# Patient Record
Sex: FEMALE | Race: WHITE | Employment: PART TIME | URBAN - METROPOLITAN AREA
[De-identification: names, ages, dates, MRNs, and addresses within clinical notes are randomized per-mention and may not be internally consistent; named-entity substitution may affect disease eponyms.]

---

## 2021-01-12 ENCOUNTER — HOSPITAL ENCOUNTER (OUTPATIENT)
Dept: ULTRASOUND IMAGING | Facility: HOSPITAL | Age: 31
Discharge: HOME | End: 2021-01-12
Attending: NURSE PRACTITIONER
Payer: COMMERCIAL

## 2021-01-12 ENCOUNTER — OFFICE VISIT (OUTPATIENT)
Dept: OBGYN CLINIC | Facility: CLINIC | Age: 31
End: 2021-01-12
Payer: COMMERCIAL

## 2021-01-12 ENCOUNTER — DOCUMENTATION ONLY (OUTPATIENT)
Dept: OBGYN CLINIC | Facility: CLINIC | Age: 31
End: 2021-01-12

## 2021-01-12 VITALS
HEART RATE: 94 BPM | SYSTOLIC BLOOD PRESSURE: 96 MMHG | HEIGHT: 63 IN | BODY MASS INDEX: 18.78 KG/M2 | DIASTOLIC BLOOD PRESSURE: 62 MMHG | WEIGHT: 106 LBS

## 2021-01-12 DIAGNOSIS — O36.8390 UNABLE TO HEAR FETAL HEART TONES AS REASON FOR ULTRASOUND SCAN: ICD-10-CM

## 2021-01-12 DIAGNOSIS — N92.6 MISSED MENSES: Primary | ICD-10-CM

## 2021-01-12 LAB
CONTROL LINE PRESENT WITH A CLEAR BACKGROUND (YES/NO): YES YES/NO
KIT LOT #: 0 NUMERIC
PREGNANCY TEST, URINE: POSITIVE

## 2021-01-12 PROCEDURE — 3078F DIAST BP <80 MM HG: CPT | Performed by: NURSE PRACTITIONER

## 2021-01-12 PROCEDURE — 3008F BODY MASS INDEX DOCD: CPT | Performed by: NURSE PRACTITIONER

## 2021-01-12 PROCEDURE — 3074F SYST BP LT 130 MM HG: CPT | Performed by: NURSE PRACTITIONER

## 2021-01-12 PROCEDURE — 76801 OB US < 14 WKS SINGLE FETUS: CPT | Performed by: NURSE PRACTITIONER

## 2021-01-12 PROCEDURE — 99203 OFFICE O/P NEW LOW 30 MIN: CPT | Performed by: NURSE PRACTITIONER

## 2021-01-12 PROCEDURE — 81025 URINE PREGNANCY TEST: CPT | Performed by: NURSE PRACTITIONER

## 2021-01-12 NOTE — PROGRESS NOTES
[facility], [facility]  Obstetrics and Gynecology  Missed Menses Visit  Queenie Holloway, MSN, APRN, FNP-BC    HPI   Kat Eddy is a 27year old  with Patient's last menstrual period was 10/15/2020. who presents for pregnancy confirmation.      Pa Number of children: Not on file      Years of education: Not on file      Highest education level: Not on file    Occupational History      Not on file    Social Needs      Financial resource strain: Not on file      Food insecurity        Worry: Not on fi 1ST TRIMESTER (CPT=76801)        Plan   - Unable to auscultate FHTs in office today - to go for stat hold and call  - Undecided on FTS at this time. Pt and SO counseled as she should be almost 13 weeks according to LMP.   For now will proceed with regular understanding for future plan of care.   - Greater than 50% of 30 minute visit spent in face to face counseling      Jo-Ann Ambriz, APRN  2:43 PM  1/12/2021

## 2021-01-13 NOTE — PROGRESS NOTES
Received phone call from [address] [facility] to review stat hold and call US results on 1/12/21 at 5:50 pm. Per US - single live IUP measuring 8w3d.   Upon speaking with patient, patient notes that she forgot to mention during our appointment that she did have bleeding cons

## 2021-01-20 ENCOUNTER — TELEPHONE (OUTPATIENT)
Dept: OBGYN CLINIC | Facility: CLINIC | Age: 31
End: 2021-01-20

## 2021-01-20 DIAGNOSIS — Z32.01 PREGNANCY TEST POSITIVE: Primary | ICD-10-CM

## 2021-01-21 ENCOUNTER — NURSE ONLY (OUTPATIENT)
Dept: OBGYN CLINIC | Facility: CLINIC | Age: 31
End: 2021-01-21

## 2021-01-21 ENCOUNTER — ROUTINE PRENATAL (OUTPATIENT)
Dept: OBGYN CLINIC | Facility: CLINIC | Age: 31
End: 2021-01-21
Payer: COMMERCIAL

## 2021-01-21 VITALS — BODY MASS INDEX: 19 KG/M2 | WEIGHT: 109 LBS | DIASTOLIC BLOOD PRESSURE: 72 MMHG | SYSTOLIC BLOOD PRESSURE: 104 MMHG

## 2021-01-21 DIAGNOSIS — N93.9 VAGINAL BLEEDING: ICD-10-CM

## 2021-01-21 DIAGNOSIS — R30.0 DYSURIA: ICD-10-CM

## 2021-01-21 DIAGNOSIS — R10.9 ABDOMINAL CRAMPING: Primary | ICD-10-CM

## 2021-01-21 DIAGNOSIS — O41.8X10 SUBCHORIONIC HEMATOMA IN FIRST TRIMESTER, SINGLE OR UNSPECIFIED FETUS: ICD-10-CM

## 2021-01-21 DIAGNOSIS — Z34.01 ENCOUNTER FOR SUPERVISION OF NORMAL FIRST PREGNANCY IN FIRST TRIMESTER: Primary | ICD-10-CM

## 2021-01-21 DIAGNOSIS — O46.8X1 SUBCHORIONIC HEMATOMA IN FIRST TRIMESTER, SINGLE OR UNSPECIFIED FETUS: ICD-10-CM

## 2021-01-21 LAB
BILIRUB UR QL: NEGATIVE
COLOR UR: YELLOW
GLUCOSE UR-MCNC: NEGATIVE MG/DL
KETONES UR-MCNC: NEGATIVE MG/DL
LEUKOCYTE ESTERASE UR QL STRIP.AUTO: NEGATIVE
NITRITE UR QL STRIP.AUTO: NEGATIVE
PH UR: 5 [PH] (ref 5–8)
PROT UR-MCNC: NEGATIVE MG/DL
RBC #/AREA URNS AUTO: 1 /HPF
SP GR UR STRIP: 1.02 (ref 1–1.03)
UROBILINOGEN UR STRIP-ACNC: <2
WBC #/AREA URNS AUTO: 2 /HPF

## 2021-01-21 PROCEDURE — 3078F DIAST BP <80 MM HG: CPT | Performed by: OBSTETRICS & GYNECOLOGY

## 2021-01-21 PROCEDURE — 99204 OFFICE O/P NEW MOD 45 MIN: CPT | Performed by: OBSTETRICS & GYNECOLOGY

## 2021-01-21 PROCEDURE — 3074F SYST BP LT 130 MM HG: CPT | Performed by: OBSTETRICS & GYNECOLOGY

## 2021-01-21 PROCEDURE — 76817 TRANSVAGINAL US OBSTETRIC: CPT | Performed by: OBSTETRICS & GYNECOLOGY

## 2021-01-21 NOTE — TELEPHONE ENCOUNTER
Pt noted she was eating dinner and noted on urination had pink/red,  Had a abd cramp that lasted about 60 sec.   Pt counseled on going to the ER if has any sx,   Currently [t has no sx and pt plans on calling office in the morning to schedule appt in office

## 2021-01-21 NOTE — PROGRESS NOTES
HPI:   Aaron Hidalgo is a 27year old female who presents for a new pt  problem visit. Pt c/o abd cramping and vaginal bleeding last night. Pt was counseled extensively on possible etiologies of cramping and vaginal bleeding.  Order for abo rh se Corpus luteum cyst right ovary.         Dictated by (CST): Vicenta Sultana MD on 1/12/2021 at [facility] 24 by (CST): Vicenta Sultana MD on 1/12/2021 at 5:49 PM             Wt Readings from Last 6 Encounters:  01/21/21 : 109 lb (49.4 kg) is normal,scant discharge,cervix is pink,no adnexal masses or tenderness,     tv ultrasound shows:  Normal uterus, normal adnexa, normal cervix, long and closed  siup crl 2/79 cm 9 4/7 weeks and edc 8/22/21  2.5 X 0.39 cm subchor hemm noted, advised pelvic

## 2021-01-21 NOTE — PROGRESS NOTES
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  Education materials reviewed with pt including, but not limited to: plan of care,

## 2021-01-28 PROBLEM — O41.8X10 SUBCHORIONIC HEMATOMA IN FIRST TRIMESTER: Status: ACTIVE | Noted: 2021-01-28

## 2021-01-28 PROBLEM — R10.9 ABDOMINAL CRAMPING: Status: ACTIVE | Noted: 2021-01-28

## 2021-01-28 PROBLEM — O46.8X1 SUBCHORIONIC HEMATOMA IN FIRST TRIMESTER: Status: ACTIVE | Noted: 2021-01-28

## 2021-01-30 ENCOUNTER — TELEPHONE (OUTPATIENT)
Dept: OBGYN CLINIC | Facility: CLINIC | Age: 31
End: 2021-01-30

## 2021-01-30 NOTE — TELEPHONE ENCOUNTER
Per pt 11 week and recently stated having nausea with vomiting. Is able to keep fluids down, but having difficulty keeping solids down. Had soup over an hour ago and was able to keep that down. Feeling better.  Reviewed home remedies and when to call office

## 2021-01-30 NOTE — TELEPHONE ENCOUNTER
Patient calling stating she has been vomiting for the last 24 hours and wants to know if that is ok? And if she can get something for it.     thanks

## 2021-02-18 ENCOUNTER — INITIAL PRENATAL (OUTPATIENT)
Dept: OBGYN CLINIC | Facility: CLINIC | Age: 31
End: 2021-02-18
Payer: COMMERCIAL

## 2021-02-18 VITALS — WEIGHT: 109 LBS | DIASTOLIC BLOOD PRESSURE: 62 MMHG | SYSTOLIC BLOOD PRESSURE: 98 MMHG | BODY MASS INDEX: 19 KG/M2

## 2021-02-18 DIAGNOSIS — Z34.91 NORMAL PREGNANCY IN FIRST TRIMESTER: Primary | ICD-10-CM

## 2021-02-18 LAB
APPEARANCE: CLEAR
MULTISTIX LOT#: 1044 NUMERIC
PH, URINE: 6 (ref 4.5–8)
SPECIFIC GRAVITY: 1.02 (ref 1–1.03)
URINE-COLOR: YELLOW
UROBILINOGEN,SEMI-QN: 0.2 MG/DL (ref 0–1.9)

## 2021-02-18 PROCEDURE — 3074F SYST BP LT 130 MM HG: CPT | Performed by: OBSTETRICS & GYNECOLOGY

## 2021-02-18 PROCEDURE — 81002 URINALYSIS NONAUTO W/O SCOPE: CPT | Performed by: OBSTETRICS & GYNECOLOGY

## 2021-02-18 PROCEDURE — 3078F DIAST BP <80 MM HG: CPT | Performed by: OBSTETRICS & GYNECOLOGY

## 2021-02-18 RX ORDER — DOXYLAMINE SUCCINATE AND PYRIDOXINE HYDROCHLORIDE, DELAYED RELEASE TABLETS 10 MG/10 MG 10; 10 MG/1; MG/1
2 TABLET, DELAYED RELEASE ORAL NIGHTLY
Qty: 120 TABLET | Refills: 0 | Status: SHIPPED | OUTPATIENT
Start: 2021-02-18 | End: 2021-05-14

## 2021-02-18 NOTE — PROGRESS NOTES
Pregnancy counseling. Pt did not want genetic screening. . Pt counseled on OB2 ultrasound structural survey at 20 weeks. Order entered.

## 2021-02-19 LAB
C TRACH DNA SPEC QL NAA+PROBE: NEGATIVE
HPV I/H RISK 1 DNA SPEC QL NAA+PROBE: NEGATIVE
N GONORRHOEA DNA SPEC QL NAA+PROBE: NEGATIVE

## 2021-02-22 LAB
LAST PAP RESULT: NORMAL
PAP HISTORY (OTHER THAN LAST PAP): NORMAL

## 2021-02-25 ENCOUNTER — TELEPHONE (OUTPATIENT)
Dept: OBGYN CLINIC | Facility: CLINIC | Age: 31
End: 2021-02-25

## 2021-02-25 NOTE — TELEPHONE ENCOUNTER
Approved today  QLANCD:[id number];LITO:[id number]; Review Type:Prior Auth; Coverage Start Date:02/25/2021; Coverage End Date:11/22/2021;  Drug  Doxylamine-Pyridoxine 10-10MG dr tablets  Form  [employer] Electronic PA Form (4305 NCPDP)  Original Claim Inf

## 2021-03-15 ENCOUNTER — ROUTINE PRENATAL (OUTPATIENT)
Dept: OBGYN CLINIC | Facility: CLINIC | Age: 31
End: 2021-03-15
Payer: COMMERCIAL

## 2021-03-15 VITALS — SYSTOLIC BLOOD PRESSURE: 95 MMHG | WEIGHT: 112 LBS | BODY MASS INDEX: 20 KG/M2 | DIASTOLIC BLOOD PRESSURE: 59 MMHG

## 2021-03-15 DIAGNOSIS — Z34.82 ENCOUNTER FOR SUPERVISION OF OTHER NORMAL PREGNANCY IN SECOND TRIMESTER: Primary | ICD-10-CM

## 2021-03-15 LAB
APPEARANCE: CLEAR
MULTISTIX LOT#: 5077 NUMERIC
PH, URINE: 6.5 (ref 4.5–8)
SPECIFIC GRAVITY: 1.01 (ref 1–1.03)
URINE-COLOR: YELLOW
UROBILINOGEN,SEMI-QN: 0.2 MG/DL (ref 0–1.9)

## 2021-03-15 PROCEDURE — 3078F DIAST BP <80 MM HG: CPT | Performed by: OBSTETRICS & GYNECOLOGY

## 2021-03-15 PROCEDURE — 3074F SYST BP LT 130 MM HG: CPT | Performed by: OBSTETRICS & GYNECOLOGY

## 2021-03-15 PROCEDURE — 81002 URINALYSIS NONAUTO W/O SCOPE: CPT | Performed by: OBSTETRICS & GYNECOLOGY

## 2021-03-15 NOTE — PROGRESS NOTES
[facility], [facility]  Obstetrics and Gynecology  Prenatal Visit  Giselle Ibarra MD    HPI   [address] is a 27year old. o.  17w1d weeks. Patient denies significant movement at this time.   She denies vaginal bleeding or significant crampin

## 2021-03-16 ENCOUNTER — LAB ENCOUNTER (OUTPATIENT)
Dept: LAB | Age: 31
End: 2021-03-16
Attending: OBSTETRICS & GYNECOLOGY
Payer: COMMERCIAL

## 2021-03-16 DIAGNOSIS — Z34.01 ENCOUNTER FOR SUPERVISION OF NORMAL FIRST PREGNANCY IN FIRST TRIMESTER: ICD-10-CM

## 2021-03-16 LAB
ANTIBODY SCREEN: NEGATIVE
BASOPHILS # BLD AUTO: 0.05 X10(3) UL (ref 0–0.2)
BASOPHILS NFR BLD AUTO: 0.6 %
DEPRECATED RDW RBC AUTO: 45.9 FL (ref 35.1–46.3)
EOSINOPHIL # BLD AUTO: 0.04 X10(3) UL (ref 0–0.7)
EOSINOPHIL NFR BLD AUTO: 0.4 %
ERYTHROCYTE [DISTWIDTH] IN BLOOD BY AUTOMATED COUNT: 13.3 % (ref 11–15)
HBV SURFACE AG SER-ACNC: 0.18 [IU]/L
HBV SURFACE AG SERPL QL IA: NONREACTIVE
HCT VFR BLD AUTO: 33.5 %
HCV AB SERPL QL IA: NONREACTIVE
HGB BLD-MCNC: 11.4 G/DL
IMM GRANULOCYTES # BLD AUTO: 0.05 X10(3) UL (ref 0–1)
IMM GRANULOCYTES NFR BLD: 0.6 %
LYMPHOCYTES # BLD AUTO: 1.09 X10(3) UL (ref 1–4)
LYMPHOCYTES NFR BLD AUTO: 12 %
MCH RBC QN AUTO: 31.9 PG (ref 26–34)
MCHC RBC AUTO-ENTMCNC: 34 G/DL (ref 31–37)
MCV RBC AUTO: 93.8 FL
MONOCYTES # BLD AUTO: 0.71 X10(3) UL (ref 0.1–1)
MONOCYTES NFR BLD AUTO: 7.8 %
NEUTROPHILS # BLD AUTO: 7.12 X10 (3) UL (ref 1.5–7.7)
NEUTROPHILS # BLD AUTO: 7.12 X10(3) UL (ref 1.5–7.7)
NEUTROPHILS NFR BLD AUTO: 78.6 %
PLATELET # BLD AUTO: 197 10(3)UL (ref 150–450)
RBC # BLD AUTO: 3.57 X10(6)UL
RH BLOOD TYPE: NEGATIVE
RUBV IGG SER QL: POSITIVE
RUBV IGG SER-ACNC: 329.7 IU/ML (ref 10–?)
WBC # BLD AUTO: 9.1 X10(3) UL (ref 4–11)

## 2021-03-16 PROCEDURE — 85025 COMPLETE CBC W/AUTO DIFF WBC: CPT

## 2021-03-16 PROCEDURE — 87340 HEPATITIS B SURFACE AG IA: CPT

## 2021-03-16 PROCEDURE — 86901 BLOOD TYPING SEROLOGIC RH(D): CPT

## 2021-03-16 PROCEDURE — 86780 TREPONEMA PALLIDUM: CPT

## 2021-03-16 PROCEDURE — 86900 BLOOD TYPING SEROLOGIC ABO: CPT

## 2021-03-16 PROCEDURE — 87389 HIV-1 AG W/HIV-1&-2 AB AG IA: CPT

## 2021-03-16 PROCEDURE — 86762 RUBELLA ANTIBODY: CPT

## 2021-03-16 PROCEDURE — 86850 RBC ANTIBODY SCREEN: CPT

## 2021-03-16 PROCEDURE — 86803 HEPATITIS C AB TEST: CPT

## 2021-03-16 PROCEDURE — 36415 COLL VENOUS BLD VENIPUNCTURE: CPT

## 2021-03-16 PROCEDURE — 87086 URINE CULTURE/COLONY COUNT: CPT

## 2021-03-17 LAB — T PALLIDUM AB SER QL: NEGATIVE

## 2021-03-20 PROBLEM — Z67.91 RH NEGATIVE STATE IN ANTEPARTUM PERIOD: Status: ACTIVE | Noted: 2021-03-20

## 2021-03-20 PROBLEM — O26.899 RH NEGATIVE STATE IN ANTEPARTUM PERIOD: Status: ACTIVE | Noted: 2021-03-20

## 2021-03-23 ENCOUNTER — TELEPHONE (OUTPATIENT)
Dept: OBGYN CLINIC | Facility: CLINIC | Age: 31
End: 2021-03-23

## 2021-03-23 NOTE — TELEPHONE ENCOUNTER
----- Message from Bibi Marin MD sent at 3/20/2021  8:18 PM CDT -----  O negative,  will need rhophylac at 28 weeks. Mild anemia of 11.4,  pt to take daily Fe.

## 2021-03-30 ENCOUNTER — LAB ENCOUNTER (OUTPATIENT)
Dept: LAB | Age: 31
End: 2021-03-30
Attending: OBSTETRICS & GYNECOLOGY
Payer: COMMERCIAL

## 2021-03-30 ENCOUNTER — TELEPHONE (OUTPATIENT)
Dept: OBGYN CLINIC | Facility: CLINIC | Age: 31
End: 2021-03-30

## 2021-03-30 ENCOUNTER — PATIENT MESSAGE (OUTPATIENT)
Dept: OBGYN CLINIC | Facility: CLINIC | Age: 31
End: 2021-03-30

## 2021-03-30 DIAGNOSIS — R30.0 DYSURIA: ICD-10-CM

## 2021-03-30 DIAGNOSIS — R30.0 DYSURIA: Primary | ICD-10-CM

## 2021-03-30 RX ORDER — NITROFURANTOIN 25; 75 MG/1; MG/1
100 CAPSULE ORAL 2 TIMES DAILY
Qty: 14 CAPSULE | Refills: 0 | Status: SHIPPED | OUTPATIENT
Start: 2021-03-30 | End: 2021-04-06

## 2021-03-30 NOTE — TELEPHONE ENCOUNTER
Patient name and  verified. Urine culture and UA ordered for patient per UTI protocol. Patient given central scheduling number to complete lab.

## 2021-03-30 NOTE — TELEPHONE ENCOUNTER
Patient has a UTI. That would explain her symptoms. Send in Macrobid 100 mg po BID x 7 days. I am ordering Macrobid because patient is allergic to Penicillin.   I also sent the order in my message under the Lab Tab so please do not send in the medication

## 2021-04-05 ENCOUNTER — HOSPITAL ENCOUNTER (OUTPATIENT)
Dept: PERINATAL CARE | Facility: HOSPITAL | Age: 31
Discharge: HOME | End: 2021-04-05
Attending: OBSTETRICS & GYNECOLOGY
Payer: COMMERCIAL

## 2021-04-05 VITALS
HEART RATE: 94 BPM | BODY MASS INDEX: 21 KG/M2 | DIASTOLIC BLOOD PRESSURE: 55 MMHG | SYSTOLIC BLOOD PRESSURE: 96 MMHG | WEIGHT: 117 LBS

## 2021-04-05 DIAGNOSIS — Z36.89 ENCOUNTER FOR FETAL ANATOMIC SURVEY: ICD-10-CM

## 2021-04-05 DIAGNOSIS — Z36.89 ENCOUNTER FOR FETAL ANATOMIC SURVEY: Primary | ICD-10-CM

## 2021-04-05 PROCEDURE — 76805 OB US >/= 14 WKS SNGL FETUS: CPT | Performed by: OBSTETRICS & GYNECOLOGY

## 2021-04-05 NOTE — PROGRESS NOTES
BP 96/55   Pulse 94   Wt 117 lb (53.1 kg)   LMP 11/15/2020 (Exact Date)   BMI 20.73 kg/m²       OB ULTRASOUND REPORT   See imaging tab for complete ultrasound report or in PACS    Single IUP in cephalic presentation. Placenta is posterior.    A 3 vessel

## 2021-04-13 ENCOUNTER — ROUTINE PRENATAL (OUTPATIENT)
Dept: OBGYN CLINIC | Facility: CLINIC | Age: 31
End: 2021-04-13
Payer: COMMERCIAL

## 2021-04-13 VITALS — DIASTOLIC BLOOD PRESSURE: 60 MMHG | WEIGHT: 118 LBS | BODY MASS INDEX: 21 KG/M2 | SYSTOLIC BLOOD PRESSURE: 100 MMHG

## 2021-04-13 DIAGNOSIS — Z34.82 ENCOUNTER FOR SUPERVISION OF OTHER NORMAL PREGNANCY IN SECOND TRIMESTER: Primary | ICD-10-CM

## 2021-04-13 PROCEDURE — 3078F DIAST BP <80 MM HG: CPT | Performed by: OBSTETRICS & GYNECOLOGY

## 2021-04-13 PROCEDURE — 81002 URINALYSIS NONAUTO W/O SCOPE: CPT | Performed by: OBSTETRICS & GYNECOLOGY

## 2021-04-13 PROCEDURE — 3074F SYST BP LT 130 MM HG: CPT | Performed by: OBSTETRICS & GYNECOLOGY

## 2021-04-13 NOTE — PROGRESS NOTES
No C/Os. Discussed Tdap vaccine and Rho Barry injection. All questions answered. OB ultrasound reviewed.

## 2021-04-17 ENCOUNTER — PATIENT MESSAGE (OUTPATIENT)
Dept: OBGYN CLINIC | Facility: CLINIC | Age: 31
End: 2021-04-17

## 2021-04-17 ENCOUNTER — LAB ENCOUNTER (OUTPATIENT)
Dept: LAB | Age: 31
End: 2021-04-17
Attending: OBSTETRICS & GYNECOLOGY
Payer: COMMERCIAL

## 2021-04-17 DIAGNOSIS — R30.0 DYSURIA: ICD-10-CM

## 2021-04-17 DIAGNOSIS — R30.0 DYSURIA: Primary | ICD-10-CM

## 2021-04-17 PROCEDURE — 87088 URINE BACTERIA CULTURE: CPT

## 2021-04-17 PROCEDURE — 81001 URINALYSIS AUTO W/SCOPE: CPT

## 2021-04-17 PROCEDURE — 87086 URINE CULTURE/COLONY COUNT: CPT

## 2021-04-17 PROCEDURE — 87186 SC STD MICRODIL/AGAR DIL: CPT

## 2021-04-17 RX ORDER — NITROFURANTOIN 25; 75 MG/1; MG/1
100 CAPSULE ORAL 2 TIMES DAILY
Qty: 14 CAPSULE | Refills: 0 | Status: SHIPPED | OUTPATIENT
Start: 2021-04-17 | End: 2021-04-24

## 2021-04-17 NOTE — TELEPHONE ENCOUNTER
OB History     T0    L0    SAB0  TAB0  Ectopic0  Multiple0  Live Births0   21w6d    Patient name and  verified. Patient complaining of back pain, urinary frequency and urgency. informed [employer] message was routed to [address] [place] [address].  UA and urine cx ente

## 2021-04-18 NOTE — TELEPHONE ENCOUNTER
Pt stated her pharm is closed , requested rx sent to new pharmacy,  Sent to [address],  Pt appraised and to go there now for her macrobid.

## 2021-04-18 NOTE — TELEPHONE ENCOUNTER
Pt called and left message. Pt left message today with uti sx, and urinalysis c/w prob uti. rx for macrobid sent and pt to start today, f/u office by Monday, to triage or er if sx not improving prior to Monday.

## 2021-04-18 NOTE — TELEPHONE ENCOUNTER
uti noted,  Pt called back and counseled pt and pt to start macrobid now and hydrate/cranberry juice. Pt counseled and to call if sx not resolved or worse.

## 2021-04-29 ENCOUNTER — LAB ENCOUNTER (OUTPATIENT)
Dept: LAB | Age: 31
End: 2021-04-29
Attending: OBSTETRICS & GYNECOLOGY
Payer: COMMERCIAL

## 2021-04-29 DIAGNOSIS — Z32.01 PREGNANCY TEST POSITIVE: ICD-10-CM

## 2021-04-29 PROCEDURE — 87086 URINE CULTURE/COLONY COUNT: CPT

## 2021-04-29 PROCEDURE — 87186 SC STD MICRODIL/AGAR DIL: CPT

## 2021-04-29 PROCEDURE — 87088 URINE BACTERIA CULTURE: CPT

## 2021-05-01 ENCOUNTER — TELEPHONE (OUTPATIENT)
Dept: OBGYN CLINIC | Facility: CLINIC | Age: 31
End: 2021-05-01

## 2021-05-01 RX ORDER — NITROFURANTOIN 25; 75 MG/1; MG/1
100 CAPSULE ORAL 2 TIMES DAILY
Qty: 14 CAPSULE | Refills: 0 | Status: SHIPPED | OUTPATIENT
Start: 2021-05-01 | End: 2021-05-08

## 2021-05-01 NOTE — TELEPHONE ENCOUNTER
E coli uti noted again,  rx for macrobid sent to pharmacy,  Please inform pt of urine culture results from today and macrobid rx sent to pharmacy.   Pt needs to take one tablet every 12 hours for 7 days, and needs to eat/drink right before each tablet, as w

## 2021-05-07 ENCOUNTER — TELEPHONE (OUTPATIENT)
Dept: OBGYN CLINIC | Facility: CLINIC | Age: 31
End: 2021-05-07

## 2021-05-07 NOTE — TELEPHONE ENCOUNTER
Per pt she has one more pill left today and two more doses for tomorrow. Per ASJ, pt to take to completion repeat urine culture as soon as possible after she completes tx.      Per pt she is concerned about the recurrence since this is the third infection i

## 2021-05-07 NOTE — TELEPHONE ENCOUNTER
Patient calling wanting to know if she can get a refill on medication for a UTI. States still having symptoms.  Been taking medication for over a week

## 2021-05-14 ENCOUNTER — LAB ENCOUNTER (OUTPATIENT)
Dept: LAB | Age: 31
End: 2021-05-14
Attending: OBSTETRICS & GYNECOLOGY
Payer: COMMERCIAL

## 2021-05-14 ENCOUNTER — TELEPHONE (OUTPATIENT)
Dept: OBGYN CLINIC | Facility: CLINIC | Age: 31
End: 2021-05-14

## 2021-05-14 ENCOUNTER — TELEPHONE (OUTPATIENT)
Dept: PERINATAL CARE | Facility: HOSPITAL | Age: 31
End: 2021-05-14

## 2021-05-14 ENCOUNTER — ROUTINE PRENATAL (OUTPATIENT)
Dept: OBGYN CLINIC | Facility: CLINIC | Age: 31
End: 2021-05-14
Payer: COMMERCIAL

## 2021-05-14 VITALS — WEIGHT: 124 LBS | SYSTOLIC BLOOD PRESSURE: 95 MMHG | DIASTOLIC BLOOD PRESSURE: 56 MMHG | BODY MASS INDEX: 22 KG/M2

## 2021-05-14 DIAGNOSIS — O23.42 UTI IN PREGNANCY, ANTEPARTUM, SECOND TRIMESTER: ICD-10-CM

## 2021-05-14 DIAGNOSIS — O23.42 RECURRENT UTI (URINARY TRACT INFECTION) COMPLICATING PREGNANCY, SECOND TRIMESTER: ICD-10-CM

## 2021-05-14 DIAGNOSIS — R10.9 RIGHT FLANK PAIN: ICD-10-CM

## 2021-05-14 DIAGNOSIS — Z34.82 ENCOUNTER FOR SUPERVISION OF OTHER NORMAL PREGNANCY IN SECOND TRIMESTER: Primary | ICD-10-CM

## 2021-05-14 DIAGNOSIS — O36.62X1: ICD-10-CM

## 2021-05-14 PROBLEM — Z34.90 SUPERVISION OF NORMAL PREGNANCY: Status: ACTIVE | Noted: 2021-05-14

## 2021-05-14 PROCEDURE — 87086 URINE CULTURE/COLONY COUNT: CPT

## 2021-05-14 PROCEDURE — 3074F SYST BP LT 130 MM HG: CPT | Performed by: OBSTETRICS & GYNECOLOGY

## 2021-05-14 PROCEDURE — 81002 URINALYSIS NONAUTO W/O SCOPE: CPT | Performed by: OBSTETRICS & GYNECOLOGY

## 2021-05-14 PROCEDURE — 3078F DIAST BP <80 MM HG: CPT | Performed by: OBSTETRICS & GYNECOLOGY

## 2021-05-14 NOTE — TELEPHONE ENCOUNTER
Natalie Iniguez MD      I spoke with M Dr. Carlo Morillo who said he would \"try to figure something out\" to get her in for growth ultrasound.

## 2021-05-14 NOTE — TELEPHONE ENCOUNTER
Incoming call received from [facility]. Patient scheduled for growth at 28 weeks (6/1/2021) and was soonest appointment available. Patient upset about scheduling since due to [facility] limited availbility. Does patient need to be seen sooner than 6/1/2021?  Please advis

## 2021-05-14 NOTE — TELEPHONE ENCOUNTER
Spoke to Radha Guerra to request clarification of the urgency for Growth ultrasound. Patient stated that she was told it needed to be done as soon as possible. Patient is 25 5/7. I scheduled patient at 28 wks.   Patient requested other [facility] location phone num

## 2021-05-14 NOTE — PROGRESS NOTES
Reports good fetal movements. Complains of persisting right flank pain and cloudy urine. Denies fever or chills. Patient has had recurrent urinary tract infections. Culture confirmed E. coli UTIs in March and April.   Very mild right flank tenderness on

## 2021-05-16 ENCOUNTER — HOSPITAL ENCOUNTER (OUTPATIENT)
Dept: ULTRASOUND IMAGING | Age: 31
Discharge: HOME | End: 2021-05-16
Attending: OBSTETRICS & GYNECOLOGY
Payer: COMMERCIAL

## 2021-05-16 DIAGNOSIS — O23.42 RECURRENT UTI (URINARY TRACT INFECTION) COMPLICATING PREGNANCY, SECOND TRIMESTER: ICD-10-CM

## 2021-05-16 DIAGNOSIS — R10.9 RIGHT FLANK PAIN: ICD-10-CM

## 2021-05-16 PROCEDURE — 76775 US EXAM ABDO BACK WALL LIM: CPT | Performed by: OBSTETRICS & GYNECOLOGY

## 2021-05-25 NOTE — PROGRESS NOTES
Outpatient Maternal-Fetal Medicine Consultation    Dear Dr. Carri Levine    Thank you for requesting ultrasound evaluation and maternal fetal medicine consultation on your patient Leonardo Ortiz.   As you are aware she is a 32year old female [address] [facility] ultrasound cannot rule out all structural and chromosomal abnormalities. See PACS/Imaging Tab For Complete Ultrasound Report  I interpreted the results and reviewed them with the patient.     DISCUSSION  During her visit we discussed and reviewed the fo

## 2021-06-01 ENCOUNTER — HOSPITAL ENCOUNTER (OUTPATIENT)
Dept: PERINATAL CARE | Facility: HOSPITAL | Age: 31
Discharge: HOME | End: 2021-06-01
Attending: OBSTETRICS & GYNECOLOGY
Payer: COMMERCIAL

## 2021-06-01 ENCOUNTER — ROUTINE PRENATAL (OUTPATIENT)
Dept: OBGYN CLINIC | Facility: CLINIC | Age: 31
End: 2021-06-01
Payer: COMMERCIAL

## 2021-06-01 VITALS
DIASTOLIC BLOOD PRESSURE: 66 MMHG | BODY MASS INDEX: 22 KG/M2 | SYSTOLIC BLOOD PRESSURE: 98 MMHG | HEART RATE: 106 BPM | WEIGHT: 125 LBS

## 2021-06-01 VITALS — BODY MASS INDEX: 22 KG/M2 | WEIGHT: 125.63 LBS | SYSTOLIC BLOOD PRESSURE: 92 MMHG | DIASTOLIC BLOOD PRESSURE: 60 MMHG

## 2021-06-01 DIAGNOSIS — O36.63X0 EXCESSIVE FETAL GROWTH AFFECTING MANAGEMENT OF PREGNANCY IN THIRD TRIMESTER, SINGLE OR UNSPECIFIED FETUS: ICD-10-CM

## 2021-06-01 DIAGNOSIS — Z34.83 ENCOUNTER FOR SUPERVISION OF OTHER NORMAL PREGNANCY IN THIRD TRIMESTER: Primary | ICD-10-CM

## 2021-06-01 DIAGNOSIS — O36.63X0 EXCESSIVE FETAL GROWTH AFFECTING MANAGEMENT OF PREGNANCY IN THIRD TRIMESTER, SINGLE OR UNSPECIFIED FETUS: Primary | ICD-10-CM

## 2021-06-01 PROCEDURE — 76816 OB US FOLLOW-UP PER FETUS: CPT | Performed by: OBSTETRICS & GYNECOLOGY

## 2021-06-01 PROCEDURE — 81003 URINALYSIS AUTO W/O SCOPE: CPT | Performed by: NURSE PRACTITIONER

## 2021-06-01 PROCEDURE — 3074F SYST BP LT 130 MM HG: CPT | Performed by: NURSE PRACTITIONER

## 2021-06-01 PROCEDURE — 3078F DIAST BP <80 MM HG: CPT | Performed by: NURSE PRACTITIONER

## 2021-06-01 PROCEDURE — 99242 OFF/OP CONSLTJ NEW/EST SF 20: CPT | Performed by: OBSTETRICS & GYNECOLOGY

## 2021-06-01 RX ORDER — BREAST PUMP
EACH MISCELLANEOUS
Qty: 1 EACH | Refills: 0 | Status: SHIPPED | OUTPATIENT
Start: 2021-06-01

## 2021-06-01 RX ORDER — FERROUS SULFATE 137(45) MG
TABLET, EXTENDED RELEASE ORAL DAILY
COMMUNITY

## 2021-06-01 NOTE — PROGRESS NOTES
[facility], [facility]  Obstetrics and Gynecology  28 Week Prenatal Visit  Dmitriy Buenrostro, MSN, APRN, FNP-BC      HPI   Romana Kacey is a 32year old.  28w2d weeks. TRACIE 21. Doing well.  Saw MFM today who noted excessive fetal growth, baby Omar Marquez is a 32year old female  with viable IUP at 28w2d weeks.    (Z34.83) Encounter for supervision of other normal pregnancy in third trimester  (primary encounter diagnosis)  Plan: URINALYSIS, AUTO, W/O SCOPE    (O36.63X0) Excessive fetal gr

## 2021-06-02 ENCOUNTER — TELEPHONE (OUTPATIENT)
Dept: OBGYN CLINIC | Facility: CLINIC | Age: 31
End: 2021-06-02

## 2021-06-02 DIAGNOSIS — O36.60X0 EXCESSIVE FETAL GROWTH AFFECTING MANAGEMENT OF PREGNANCY, ANTEPARTUM, SINGLE OR UNSPECIFIED FETUS: Primary | ICD-10-CM

## 2021-06-02 NOTE — TELEPHONE ENCOUNTER
Patient seen on 6/1/2021 with ANB (see exam note). ANB reviewed fetal excessive growth with patient and reinforced importance on completing 28 week labs. Next PN appt is 6/16/2021.

## 2021-06-02 NOTE — TELEPHONE ENCOUNTER
Please inform patient that as her baby is measuring large, it is important that she do her Glucola diabetes screen as soon as possible. Next prenatal appointment also needs to be scheduled within 2 weeks from her previous.     RhoGam shot also needed for R

## 2021-06-02 NOTE — TELEPHONE ENCOUNTER
Pt informed. Order placed for growth through MFM. Pt advised to cancel apt for growth at [facility]. Number given to MFM. Verbalized understanding and agreement. No further question.

## 2021-06-02 NOTE — TELEPHONE ENCOUNTER
Pt asking regarding the f/u recommendation given by MFM yesterday by Dr. Julianna Burrell, asked if we would put in the order.  Pls advise

## 2021-06-02 NOTE — TELEPHONE ENCOUNTER
Pt informed of need to have blood type and antibody screen before her rhogam. Pt verbalized understanding and informed she had made an apt for blood work this Saturday morning.  Scheduled pt for the afternoon, instructed to call prior to make sure results i

## 2021-06-03 ENCOUNTER — TELEPHONE (OUTPATIENT)
Dept: OBGYN CLINIC | Facility: CLINIC | Age: 31
End: 2021-06-03

## 2021-06-04 ENCOUNTER — LAB ENCOUNTER (OUTPATIENT)
Dept: LAB | Age: 31
End: 2021-06-04
Attending: OBSTETRICS & GYNECOLOGY
Payer: COMMERCIAL

## 2021-06-04 DIAGNOSIS — Z34.82 ENCOUNTER FOR SUPERVISION OF OTHER NORMAL PREGNANCY IN SECOND TRIMESTER: ICD-10-CM

## 2021-06-04 PROBLEM — O99.113 BENIGN GESTATIONAL THROMBOCYTOPENIA IN THIRD TRIMESTER (HCC): Status: ACTIVE | Noted: 2021-06-04

## 2021-06-04 PROBLEM — D69.6 BENIGN GESTATIONAL THROMBOCYTOPENIA IN THIRD TRIMESTER (HCC): Status: ACTIVE | Noted: 2021-06-04

## 2021-06-04 PROCEDURE — 86900 BLOOD TYPING SEROLOGIC ABO: CPT

## 2021-06-04 PROCEDURE — 86850 RBC ANTIBODY SCREEN: CPT

## 2021-06-04 PROCEDURE — 86901 BLOOD TYPING SEROLOGIC RH(D): CPT

## 2021-06-04 PROCEDURE — 82950 GLUCOSE TEST: CPT

## 2021-06-04 PROCEDURE — 36415 COLL VENOUS BLD VENIPUNCTURE: CPT

## 2021-06-04 PROCEDURE — 85025 COMPLETE CBC W/AUTO DIFF WBC: CPT

## 2021-06-05 ENCOUNTER — NURSE ONLY (OUTPATIENT)
Dept: OBGYN CLINIC | Facility: CLINIC | Age: 31
End: 2021-06-05
Payer: COMMERCIAL

## 2021-06-05 DIAGNOSIS — Z67.91 RH NEGATIVE STATE IN ANTEPARTUM PERIOD: Primary | ICD-10-CM

## 2021-06-05 DIAGNOSIS — O26.899 RH NEGATIVE STATE IN ANTEPARTUM PERIOD: Primary | ICD-10-CM

## 2021-06-05 PROCEDURE — 96372 THER/PROPH/DIAG INJ SC/IM: CPT | Performed by: OBSTETRICS & GYNECOLOGY

## 2021-06-05 NOTE — PROGRESS NOTES
LMP 11/15/2020 (Exact Date)      Pt here for Rhogam prophylactic IM injection. Rhogam 300mcg administered Intramuscularly on LUQ. Pt tolerated well, no signs of adverse reaction noted.

## 2021-06-05 NOTE — PATIENT INSTRUCTIONS
If You Are Rh Negative – Routine Administration    If you’re Rh negative, ask your health care provider about getting treated with RhoGam or Rhophylac. Even if you miscarry or don’t deliver the baby, you will still need treatment.  The health of any baby as directed      Location, date and time: [facility] 308, 06/05/2021, 11:35 am

## 2021-06-07 ENCOUNTER — TELEPHONE (OUTPATIENT)
Dept: OBGYN CLINIC | Facility: CLINIC | Age: 31
End: 2021-06-07

## 2021-06-07 DIAGNOSIS — D69.6 THROMBOCYTOPENIA AFFECTING PREGNANCY (HCC): Primary | ICD-10-CM

## 2021-06-07 DIAGNOSIS — O99.119 THROMBOCYTOPENIA AFFECTING PREGNANCY (HCC): Primary | ICD-10-CM

## 2021-06-07 NOTE — TELEPHONE ENCOUNTER
Pt called and informed of results and recommendations. Pt voices understanding. Order for CBC placed.        ----- Message from Mariah Strauss MD sent at 6/4/2021  4:45 PM CDT -----  Please inform that her platelet count was below the normal range.   As t

## 2021-06-07 NOTE — TELEPHONE ENCOUNTER
Pt called and informed of results and recommendations.  Pt voices understanding.    ----- Message from Shan Hidalgo MD sent at 6/4/2021  4:45 PM CDT -----  Please inform patient that her she is mildly anemic, that is her hemoglobin is lower than normal

## 2021-06-14 ENCOUNTER — HOSPITAL ENCOUNTER (OUTPATIENT)
Facility: HOSPITAL | Age: 31
Setting detail: OBSERVATION
Discharge: HOME | End: 2021-06-14
Attending: OBSTETRICS & GYNECOLOGY | Admitting: OBSTETRICS & GYNECOLOGY
Payer: COMMERCIAL

## 2021-06-14 ENCOUNTER — TELEPHONE (OUTPATIENT)
Dept: OBGYN CLINIC | Facility: CLINIC | Age: 31
End: 2021-06-14

## 2021-06-14 VITALS
DIASTOLIC BLOOD PRESSURE: 68 MMHG | SYSTOLIC BLOOD PRESSURE: 110 MMHG | RESPIRATION RATE: 16 BRPM | TEMPERATURE: 98 F | HEART RATE: 72 BPM

## 2021-06-14 PROBLEM — Z34.90 PREGNANCY: Status: ACTIVE | Noted: 2021-06-14

## 2021-06-14 PROCEDURE — 59025 FETAL NON-STRESS TEST: CPT | Performed by: OBSTETRICS & GYNECOLOGY
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
## 2021-06-15 NOTE — TRIAGE
[facility]      Triage Note    Kristal Fearing Patient Status:  Observation    1990 MRN [id number]   Location [address]  Attending Subha Mercado MD   Hosp Day # 0 PCP Soni Quigley.  Ayse Lee MD note  As above. Patient had no direct abdominal trauma. Denied contractions, vaginal bleeding, leaking fluid. Feeling good fetal movements. NST reactive  No contractions  Home with fm counting.   Rest.  Keep PN care appt  Melanie Stephens Md
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
## 2021-06-16 ENCOUNTER — ROUTINE PRENATAL (OUTPATIENT)
Dept: OBGYN CLINIC | Facility: CLINIC | Age: 31
End: 2021-06-16
Payer: COMMERCIAL

## 2021-06-16 VITALS — DIASTOLIC BLOOD PRESSURE: 58 MMHG | WEIGHT: 128 LBS | SYSTOLIC BLOOD PRESSURE: 100 MMHG | BODY MASS INDEX: 23 KG/M2

## 2021-06-16 DIAGNOSIS — Z34.83 ENCOUNTER FOR SUPERVISION OF OTHER NORMAL PREGNANCY IN THIRD TRIMESTER: Primary | ICD-10-CM

## 2021-06-16 PROCEDURE — 3074F SYST BP LT 130 MM HG: CPT | Performed by: OBSTETRICS & GYNECOLOGY

## 2021-06-16 PROCEDURE — 3078F DIAST BP <80 MM HG: CPT | Performed by: OBSTETRICS & GYNECOLOGY

## 2021-06-16 PROCEDURE — 81003 URINALYSIS AUTO W/O SCOPE: CPT | Performed by: OBSTETRICS & GYNECOLOGY

## 2021-06-29 ENCOUNTER — ROUTINE PRENATAL (OUTPATIENT)
Dept: OBGYN CLINIC | Facility: CLINIC | Age: 31
End: 2021-06-29
Payer: COMMERCIAL

## 2021-06-29 VITALS — WEIGHT: 130.38 LBS | BODY MASS INDEX: 23 KG/M2 | SYSTOLIC BLOOD PRESSURE: 100 MMHG | DIASTOLIC BLOOD PRESSURE: 60 MMHG

## 2021-06-29 DIAGNOSIS — Z34.83 ENCOUNTER FOR SUPERVISION OF OTHER NORMAL PREGNANCY IN THIRD TRIMESTER: Primary | ICD-10-CM

## 2021-06-29 PROCEDURE — 3078F DIAST BP <80 MM HG: CPT | Performed by: OBSTETRICS & GYNECOLOGY

## 2021-06-29 PROCEDURE — 3074F SYST BP LT 130 MM HG: CPT | Performed by: OBSTETRICS & GYNECOLOGY

## 2021-06-29 PROCEDURE — 81002 URINALYSIS NONAUTO W/O SCOPE: CPT | Performed by: OBSTETRICS & GYNECOLOGY

## 2021-07-02 ENCOUNTER — TELEPHONE (OUTPATIENT)
Dept: OBGYN CLINIC | Facility: CLINIC | Age: 31
End: 2021-07-02

## 2021-07-02 ENCOUNTER — TELEPHONE (OUTPATIENT)
Dept: PERINATAL CARE | Facility: HOSPITAL | Age: 31
End: 2021-07-02

## 2021-07-02 DIAGNOSIS — O36.63X0 EXCESSIVE FETAL GROWTH AFFECTING MANAGEMENT OF PREGNANCY IN THIRD TRIMESTER, SINGLE OR UNSPECIFIED FETUS: Primary | ICD-10-CM

## 2021-07-02 NOTE — TELEPHONE ENCOUNTER
07/2021 Spoke to Norm Ruelas with  M. Pt needs a growth ultrasound with consult. Order was placed in epic.

## 2021-07-08 NOTE — PROGRESS NOTES
Alejandro Rangel     Dear Dr. Cheyenne Chen,     Thank you for requesting ultrasound evaluation and maternal fetal medicine consultation on your patient Juancho Zhou.   As you are aware she is a 32year old female  with a Singl the increased chance of having one performed due to macrosomia. She understands that the ultrasound weight is an estimate and can have about a 20% error. I reviewed her early pregnancy dating.   She is dated by an ultrasound 8 weeks which was consisten (7 lbs 9 oz)  · Polyhydramnios     RECOMMENDATIONS:  · Continue care with Dr. Elena Flores  · Weekly NST & NANCY at 36 weeks  · Repeat growth ultrasound in 3 to 4 weeks  · I recommend delivery at 39 weeks if the polyhydramnios persists     Thank you for allowing me

## 2021-07-15 ENCOUNTER — HOSPITAL ENCOUNTER (OUTPATIENT)
Dept: PERINATAL CARE | Facility: HOSPITAL | Age: 31
Discharge: HOME | End: 2021-07-15
Attending: OBSTETRICS & GYNECOLOGY
Payer: COMMERCIAL

## 2021-07-15 VITALS
HEART RATE: 81 BPM | WEIGHT: 134 LBS | SYSTOLIC BLOOD PRESSURE: 98 MMHG | BODY MASS INDEX: 24 KG/M2 | DIASTOLIC BLOOD PRESSURE: 60 MMHG

## 2021-07-15 DIAGNOSIS — O36.63X0 EXCESSIVE FETAL GROWTH AFFECTING MANAGEMENT OF PREGNANCY IN THIRD TRIMESTER, SINGLE OR UNSPECIFIED FETUS: Primary | ICD-10-CM

## 2021-07-15 DIAGNOSIS — O99.113 BENIGN GESTATIONAL THROMBOCYTOPENIA IN THIRD TRIMESTER (HCC): ICD-10-CM

## 2021-07-15 DIAGNOSIS — D69.6 BENIGN GESTATIONAL THROMBOCYTOPENIA IN THIRD TRIMESTER (HCC): ICD-10-CM

## 2021-07-15 DIAGNOSIS — O40.3XX0 POLYHYDRAMNIOS IN THIRD TRIMESTER COMPLICATION, SINGLE OR UNSPECIFIED FETUS: ICD-10-CM

## 2021-07-15 DIAGNOSIS — O36.63X0 EXCESSIVE FETAL GROWTH AFFECTING MANAGEMENT OF PREGNANCY IN THIRD TRIMESTER, SINGLE OR UNSPECIFIED FETUS: ICD-10-CM

## 2021-07-15 PROCEDURE — 76816 OB US FOLLOW-UP PER FETUS: CPT | Performed by: OBSTETRICS & GYNECOLOGY

## 2021-07-15 PROCEDURE — 76819 FETAL BIOPHYS PROFIL W/O NST: CPT | Performed by: OBSTETRICS & GYNECOLOGY

## 2021-07-15 PROCEDURE — 99214 OFFICE O/P EST MOD 30 MIN: CPT | Performed by: OBSTETRICS & GYNECOLOGY

## 2021-07-16 ENCOUNTER — TELEPHONE (OUTPATIENT)
Dept: OBGYN CLINIC | Facility: CLINIC | Age: 31
End: 2021-07-16

## 2021-07-16 PROBLEM — O40.9XX0 POLYHYDRAMNIOS, ANTEPARTUM COMPLICATION: Status: ACTIVE | Noted: 2021-07-16

## 2021-07-16 PROBLEM — Z34.90 PREGNANCY: Status: RESOLVED | Noted: 2021-06-14 | Resolved: 2021-07-16

## 2021-07-16 NOTE — TELEPHONE ENCOUNTER
Pt had ultrasound on 7/15 and she was told the baby is measuring & weighing 4 weeks larger. She is emotional and wants to know what the plans if the baby will be sooner or not.     Please advise if the 7/22 should be pulled up sooner because she may be far

## 2021-07-16 NOTE — TELEPHONE ENCOUNTER
Called pt and left message. Then I noted that you gave her a pn appt tomorrow. I can also speak with her about her mfm results tomorrow.

## 2021-07-16 NOTE — TELEPHONE ENCOUNTER
Patient name and  verified. Patient informed message sent to ASJ to review. PN appt given for tomorrow with ASJ. No further questions.

## 2021-07-17 ENCOUNTER — ROUTINE PRENATAL (OUTPATIENT)
Dept: OBGYN CLINIC | Facility: CLINIC | Age: 31
End: 2021-07-17
Payer: COMMERCIAL

## 2021-07-17 VITALS — DIASTOLIC BLOOD PRESSURE: 72 MMHG | BODY MASS INDEX: 24 KG/M2 | SYSTOLIC BLOOD PRESSURE: 108 MMHG | WEIGHT: 134.19 LBS

## 2021-07-17 DIAGNOSIS — Z34.83 ENCOUNTER FOR SUPERVISION OF OTHER NORMAL PREGNANCY IN THIRD TRIMESTER: Primary | ICD-10-CM

## 2021-07-17 LAB
BILIRUBIN: NEGATIVE
GLUCOSE (URINE DIPSTICK): NEGATIVE MG/DL
KETONES (URINE DIPSTICK): NEGATIVE MG/DL
LEUKOCYTES: NEGATIVE
MULTISTIX LOT#: NORMAL NUMERIC
NITRITE, URINE: NEGATIVE
OCCULT BLOOD: NEGATIVE
PH, URINE: 6.5 (ref 4.5–8)
PROTEIN (URINE DIPSTICK): NEGATIVE MG/DL
SPECIFIC GRAVITY: 1.01 (ref 1–1.03)
URINE-COLOR: YELLOW
UROBILINOGEN,SEMI-QN: 0.2 MG/DL (ref 0–1.9)

## 2021-07-17 PROCEDURE — 81002 URINALYSIS NONAUTO W/O SCOPE: CPT | Performed by: OBSTETRICS & GYNECOLOGY

## 2021-07-17 PROCEDURE — 3074F SYST BP LT 130 MM HG: CPT | Performed by: OBSTETRICS & GYNECOLOGY

## 2021-07-17 PROCEDURE — 3078F DIAST BP <80 MM HG: CPT | Performed by: OBSTETRICS & GYNECOLOGY

## 2021-07-29 ENCOUNTER — HOSPITAL ENCOUNTER (OUTPATIENT)
Dept: PERINATAL CARE | Facility: HOSPITAL | Age: 31
Discharge: HOME | End: 2021-07-29
Attending: OBSTETRICS & GYNECOLOGY
Payer: COMMERCIAL

## 2021-07-29 DIAGNOSIS — O99.113 BENIGN GESTATIONAL THROMBOCYTOPENIA IN THIRD TRIMESTER (HCC): Primary | ICD-10-CM

## 2021-07-29 DIAGNOSIS — O40.9XX0 POLYHYDRAMNIOS, ANTEPARTUM, SINGLE OR UNSPECIFIED FETUS: ICD-10-CM

## 2021-07-29 DIAGNOSIS — D69.6 BENIGN GESTATIONAL THROMBOCYTOPENIA IN THIRD TRIMESTER (HCC): Primary | ICD-10-CM

## 2021-07-29 DIAGNOSIS — O40.9XX0 POLYHYDRAMNIOS, ANTEPARTUM, SINGLE OR UNSPECIFIED FETUS: Primary | ICD-10-CM

## 2021-07-29 PROCEDURE — 76815 OB US LIMITED FETUS(S): CPT | Performed by: OBSTETRICS & GYNECOLOGY

## 2021-07-29 PROCEDURE — 59025 FETAL NON-STRESS TEST: CPT | Performed by: OBSTETRICS & GYNECOLOGY

## 2021-07-29 NOTE — NST
Nonstress Test   Patient: Juancho Zhou    Gestation: 87H2E    Diagnosis from order:  Polyhydramnios;       NST: reactive         NST DOCUMENTATION 7/29/2021   Variability 6-25 BPM   Accelerations Yes   Decelerations None   Baseline 130   Uterine Irri

## 2021-07-29 NOTE — ADDENDUM NOTE
Encounter addended by: Shadi Linda MD on: 7/29/2021 2:40 PM   Actions taken: Clinical Note Signed, Charge Capture section accepted

## 2021-07-29 NOTE — PROGRESS NOTES
Encounter for limited ultrasound for amniotic fluid index. Ultrasound Findings:  Single IUP in transverse presentation. Placenta is anterior, fundal.   A 3 vessel cord is noted. Cardiac activity is present at 145 bpm  Polyhydramnios MVP is 10.8 cm .

## 2021-07-30 ENCOUNTER — LAB ENCOUNTER (OUTPATIENT)
Dept: LAB | Facility: HOSPITAL | Age: 31
End: 2021-07-30
Attending: OBSTETRICS & GYNECOLOGY
Payer: COMMERCIAL

## 2021-07-30 ENCOUNTER — ROUTINE PRENATAL (OUTPATIENT)
Dept: OBGYN CLINIC | Facility: CLINIC | Age: 31
End: 2021-07-30
Payer: COMMERCIAL

## 2021-07-30 ENCOUNTER — TELEPHONE (OUTPATIENT)
Dept: OBGYN CLINIC | Facility: CLINIC | Age: 31
End: 2021-07-30

## 2021-07-30 VITALS — SYSTOLIC BLOOD PRESSURE: 118 MMHG | BODY MASS INDEX: 24 KG/M2 | DIASTOLIC BLOOD PRESSURE: 20 MMHG | WEIGHT: 137.63 LBS

## 2021-07-30 DIAGNOSIS — O99.119 THROMBOCYTOPENIA AFFECTING PREGNANCY (HCC): ICD-10-CM

## 2021-07-30 DIAGNOSIS — L29.9 PRURITUS: ICD-10-CM

## 2021-07-30 DIAGNOSIS — D69.6 THROMBOCYTOPENIA AFFECTING PREGNANCY (HCC): ICD-10-CM

## 2021-07-30 DIAGNOSIS — Z34.83 ENCOUNTER FOR SUPERVISION OF OTHER NORMAL PREGNANCY IN THIRD TRIMESTER: ICD-10-CM

## 2021-07-30 DIAGNOSIS — Z34.83 ENCOUNTER FOR SUPERVISION OF OTHER NORMAL PREGNANCY IN THIRD TRIMESTER: Primary | ICD-10-CM

## 2021-07-30 PROBLEM — N89.8 VAGINAL CYST: Status: ACTIVE | Noted: 2021-07-30

## 2021-07-30 PROBLEM — O41.8X10 SUBCHORIONIC HEMATOMA IN FIRST TRIMESTER: Status: RESOLVED | Noted: 2021-01-28 | Resolved: 2021-07-30

## 2021-07-30 PROBLEM — O46.8X1 SUBCHORIONIC HEMATOMA IN FIRST TRIMESTER: Status: RESOLVED | Noted: 2021-01-28 | Resolved: 2021-07-30

## 2021-07-30 LAB
APPEARANCE: CLEAR
BASOPHILS # BLD AUTO: 0.02 X10(3) UL (ref 0–0.2)
BASOPHILS NFR BLD AUTO: 0.3 %
BILIRUBIN: NEGATIVE
DEPRECATED RDW RBC AUTO: 49.8 FL (ref 35.1–46.3)
EOSINOPHIL # BLD AUTO: 0.03 X10(3) UL (ref 0–0.7)
EOSINOPHIL NFR BLD AUTO: 0.4 %
ERYTHROCYTE [DISTWIDTH] IN BLOOD BY AUTOMATED COUNT: 15.5 % (ref 11–15)
GLUCOSE (URINE DIPSTICK): NEGATIVE MG/DL
HCT VFR BLD AUTO: 33.6 %
HGB BLD-MCNC: 10.4 G/DL
IMM GRANULOCYTES # BLD AUTO: 0.07 X10(3) UL (ref 0–1)
IMM GRANULOCYTES NFR BLD: 1 %
KETONES (URINE DIPSTICK): NEGATIVE MG/DL
LYMPHOCYTES # BLD AUTO: 0.94 X10(3) UL (ref 1–4)
LYMPHOCYTES NFR BLD AUTO: 13.6 %
MCH RBC QN AUTO: 27.4 PG (ref 26–34)
MCHC RBC AUTO-ENTMCNC: 31 G/DL (ref 31–37)
MCV RBC AUTO: 88.7 FL
MONOCYTES # BLD AUTO: 0.57 X10(3) UL (ref 0.1–1)
MONOCYTES NFR BLD AUTO: 8.3 %
MULTISTIX LOT#: ABNORMAL NUMERIC
NEUTROPHILS # BLD AUTO: 5.27 X10 (3) UL (ref 1.5–7.7)
NEUTROPHILS # BLD AUTO: 5.27 X10(3) UL (ref 1.5–7.7)
NEUTROPHILS NFR BLD AUTO: 76.4 %
NITRITE, URINE: NEGATIVE
OCCULT BLOOD: NEGATIVE
PH, URINE: 7 (ref 4.5–8)
PLATELET # BLD AUTO: 163 10(3)UL (ref 150–450)
PROTEIN (URINE DIPSTICK): NEGATIVE MG/DL
RBC # BLD AUTO: 3.79 X10(6)UL
SPECIFIC GRAVITY: 1.02 (ref 1–1.03)
URINE-COLOR: YELLOW
UROBILINOGEN,SEMI-QN: 1 MG/DL (ref 0–1.9)
WBC # BLD AUTO: 6.9 X10(3) UL (ref 4–11)

## 2021-07-30 PROCEDURE — 85025 COMPLETE CBC W/AUTO DIFF WBC: CPT

## 2021-07-30 PROCEDURE — 36415 COLL VENOUS BLD VENIPUNCTURE: CPT

## 2021-07-30 PROCEDURE — 3074F SYST BP LT 130 MM HG: CPT | Performed by: OBSTETRICS & GYNECOLOGY

## 2021-07-30 PROCEDURE — 87389 HIV-1 AG W/HIV-1&-2 AB AG IA: CPT

## 2021-07-30 PROCEDURE — 3078F DIAST BP <80 MM HG: CPT | Performed by: OBSTETRICS & GYNECOLOGY

## 2021-07-30 PROCEDURE — 86780 TREPONEMA PALLIDUM: CPT

## 2021-07-30 PROCEDURE — 81002 URINALYSIS NONAUTO W/O SCOPE: CPT | Performed by: OBSTETRICS & GYNECOLOGY

## 2021-07-30 PROCEDURE — 82239 BILE ACIDS TOTAL: CPT

## 2021-07-30 NOTE — PROGRESS NOTES
No complaints. Good fetal movements. Ultrasound showing stable polyhydramnios and baby in a transverse lie. Will discuss and offer external cephalic version. LGA baby. Offered ECV. Wishes to \"think about it\". To go for 35 week labs. C/o pruritus.

## 2021-07-30 NOTE — TELEPHONE ENCOUNTER
Please inform patient that her OB ultrasound to check amniotic fluid showed that her polyhydramnios is stable. She should continue her prenatal care and testing as outlined. Weekly NSTs and NANCY with growth ultrasound next week.     M ultrasound Impressi

## 2021-07-31 ENCOUNTER — TELEPHONE (OUTPATIENT)
Dept: OBGYN CLINIC | Facility: CLINIC | Age: 31
End: 2021-07-31

## 2021-08-01 LAB — GROUP B STREP BY PCR FOR PCR OVT: NEGATIVE

## 2021-08-02 LAB — T PALLIDUM AB SER QL: NEGATIVE

## 2021-08-03 LAB — BILE ACIDS, TOTAL: 12 UMOL/L

## 2021-08-04 ENCOUNTER — TELEPHONE (OUTPATIENT)
Dept: OBGYN CLINIC | Facility: CLINIC | Age: 31
End: 2021-08-04

## 2021-08-04 ENCOUNTER — HOSPITAL ENCOUNTER (INPATIENT)
Facility: HOSPITAL | Age: 31
LOS: 5 days | Discharge: HOME | End: 2021-08-09
Attending: OBSTETRICS & GYNECOLOGY | Admitting: OBSTETRICS & GYNECOLOGY
Payer: COMMERCIAL

## 2021-08-04 PROBLEM — O26.619 CHOLESTASIS OF PREGNANCY: Status: ACTIVE | Noted: 2021-08-04

## 2021-08-04 PROBLEM — K83.1 CHOLESTASIS OF PREGNANCY: Status: ACTIVE | Noted: 2021-08-04

## 2021-08-04 PROBLEM — Z34.90 PREGNANCY: Status: ACTIVE | Noted: 2021-08-04

## 2021-08-04 LAB
ANTIBODY SCREEN: POSITIVE
BASOPHILS # BLD AUTO: 0.03 X10(3) UL (ref 0–0.2)
BASOPHILS NFR BLD AUTO: 0.4 %
DEPRECATED RDW RBC AUTO: 49.9 FL (ref 35.1–46.3)
DIRECT COOMBS POLY: NEGATIVE
EOSINOPHIL # BLD AUTO: 0.03 X10(3) UL (ref 0–0.7)
EOSINOPHIL NFR BLD AUTO: 0.4 %
ERYTHROCYTE [DISTWIDTH] IN BLOOD BY AUTOMATED COUNT: 15.4 % (ref 11–15)
HCT VFR BLD AUTO: 35.9 %
HGB BLD-MCNC: 11.1 G/DL
IMM GRANULOCYTES # BLD AUTO: 0.08 X10(3) UL (ref 0–1)
IMM GRANULOCYTES NFR BLD: 1 %
LYMPHOCYTES # BLD AUTO: 1.26 X10(3) UL (ref 1–4)
LYMPHOCYTES NFR BLD AUTO: 15.7 %
MCH RBC QN AUTO: 27.1 PG (ref 26–34)
MCHC RBC AUTO-ENTMCNC: 30.9 G/DL (ref 31–37)
MCV RBC AUTO: 87.8 FL
MONOCYTES # BLD AUTO: 0.65 X10(3) UL (ref 0.1–1)
MONOCYTES NFR BLD AUTO: 8.1 %
NEUTROPHILS # BLD AUTO: 5.97 X10 (3) UL (ref 1.5–7.7)
NEUTROPHILS # BLD AUTO: 5.97 X10(3) UL (ref 1.5–7.7)
NEUTROPHILS NFR BLD AUTO: 74.4 %
PLATELET # BLD AUTO: 146 10(3)UL (ref 150–450)
RBC # BLD AUTO: 4.09 X10(6)UL
RH BLOOD TYPE: NEGATIVE
SARS-COV-2 RNA RESP QL NAA+PROBE: NOT DETECTED
WBC # BLD AUTO: 8 X10(3) UL (ref 4–11)

## 2021-08-04 PROCEDURE — 3E033VJ INTRODUCTION OF OTHER HORMONE INTO PERIPHERAL VEIN, PERCUTANEOUS APPROACH: ICD-10-PCS | Performed by: OBSTETRICS & GYNECOLOGY

## 2021-08-04 PROCEDURE — 3E0P7VZ INTRODUCTION OF HORMONE INTO FEMALE REPRODUCTIVE, VIA NATURAL OR ARTIFICIAL OPENING: ICD-10-PCS | Performed by: OBSTETRICS & GYNECOLOGY

## 2021-08-04 PROCEDURE — 76818 FETAL BIOPHYS PROFILE W/NST: CPT | Performed by: OBSTETRICS & GYNECOLOGY

## 2021-08-04 RX ORDER — TRISODIUM CITRATE DIHYDRATE AND CITRIC ACID MONOHYDRATE 500; 334 MG/5ML; MG/5ML
30 SOLUTION ORAL AS NEEDED
Status: DISCONTINUED | OUTPATIENT
Start: 2021-08-04 | End: 2021-08-04

## 2021-08-04 RX ORDER — IBUPROFEN 600 MG/1
600 TABLET ORAL EVERY 6 HOURS PRN
Status: DISCONTINUED | OUTPATIENT
Start: 2021-08-04 | End: 2021-08-06 | Stop reason: HOSPADM

## 2021-08-04 RX ORDER — ONDANSETRON 2 MG/ML
4 INJECTION INTRAMUSCULAR; INTRAVENOUS EVERY 6 HOURS PRN
Status: DISCONTINUED | OUTPATIENT
Start: 2021-08-04 | End: 2021-08-06 | Stop reason: HOSPADM

## 2021-08-04 RX ORDER — DEXTROSE, SODIUM CHLORIDE, SODIUM LACTATE, POTASSIUM CHLORIDE, AND CALCIUM CHLORIDE 5; .6; .31; .03; .02 G/100ML; G/100ML; G/100ML; G/100ML; G/100ML
INJECTION, SOLUTION INTRAVENOUS CONTINUOUS
Status: DISCONTINUED | OUTPATIENT
Start: 2021-08-04 | End: 2021-08-06 | Stop reason: HOSPADM

## 2021-08-04 RX ORDER — SODIUM CHLORIDE, SODIUM LACTATE, POTASSIUM CHLORIDE, CALCIUM CHLORIDE 600; 310; 30; 20 MG/100ML; MG/100ML; MG/100ML; MG/100ML
125 INJECTION, SOLUTION INTRAVENOUS CONTINUOUS
Status: DISCONTINUED | OUTPATIENT
Start: 2021-08-04 | End: 2021-08-06 | Stop reason: HOSPADM

## 2021-08-04 RX ORDER — ACETAMINOPHEN 500 MG
500 TABLET ORAL EVERY 6 HOURS PRN
Status: DISCONTINUED | OUTPATIENT
Start: 2021-08-04 | End: 2021-08-04

## 2021-08-04 RX ORDER — ONDANSETRON 2 MG/ML
4 INJECTION INTRAMUSCULAR; INTRAVENOUS EVERY 6 HOURS PRN
Status: DISCONTINUED | OUTPATIENT
Start: 2021-08-04 | End: 2021-08-04

## 2021-08-04 RX ORDER — ACETAMINOPHEN 500 MG
1000 TABLET ORAL ONCE
Status: DISCONTINUED | OUTPATIENT
Start: 2021-08-04 | End: 2021-08-04

## 2021-08-04 RX ORDER — TRISODIUM CITRATE DIHYDRATE AND CITRIC ACID MONOHYDRATE 500; 334 MG/5ML; MG/5ML
30 SOLUTION ORAL AS NEEDED
Status: COMPLETED | OUTPATIENT
Start: 2021-08-04 | End: 2021-08-05

## 2021-08-04 RX ORDER — URSODIOL 300 MG/1
300 CAPSULE ORAL 2 TIMES DAILY
Qty: 20 CAPSULE | Refills: 0 | Status: ON HOLD | OUTPATIENT
Start: 2021-08-04 | End: 2021-08-09

## 2021-08-04 RX ORDER — LIDOCAINE HYDROCHLORIDE 10 MG/ML
30 INJECTION, SOLUTION EPIDURAL; INFILTRATION; INTRACAUDAL; PERINEURAL ONCE
Status: DISCONTINUED | OUTPATIENT
Start: 2021-08-04 | End: 2021-08-06 | Stop reason: HOSPADM

## 2021-08-04 RX ORDER — METOCLOPRAMIDE 10 MG/1
10 TABLET ORAL ONCE
Status: DISCONTINUED | OUTPATIENT
Start: 2021-08-04 | End: 2021-08-04

## 2021-08-04 RX ORDER — CLINDAMYCIN PHOSPHATE 900 MG/50ML
900 INJECTION INTRAVENOUS ONCE
Status: DISCONTINUED | OUTPATIENT
Start: 2021-08-04 | End: 2021-08-04

## 2021-08-04 RX ORDER — TERBUTALINE SULFATE 1 MG/ML
0.25 INJECTION, SOLUTION SUBCUTANEOUS AS NEEDED
Status: DISCONTINUED | OUTPATIENT
Start: 2021-08-04 | End: 2021-08-04

## 2021-08-04 RX ORDER — TRISODIUM CITRATE DIHYDRATE AND CITRIC ACID MONOHYDRATE 500; 334 MG/5ML; MG/5ML
30 SOLUTION ORAL ONCE
Status: DISCONTINUED | OUTPATIENT
Start: 2021-08-04 | End: 2021-08-04

## 2021-08-04 RX ORDER — FAMOTIDINE 10 MG/ML
20 INJECTION, SOLUTION INTRAVENOUS ONCE
Status: DISCONTINUED | OUTPATIENT
Start: 2021-08-04 | End: 2021-08-04

## 2021-08-04 RX ORDER — LIDOCAINE HYDROCHLORIDE 10 MG/ML
30 INJECTION, SOLUTION EPIDURAL; INFILTRATION; INTRACAUDAL; PERINEURAL ONCE
Status: DISCONTINUED | OUTPATIENT
Start: 2021-08-04 | End: 2021-08-04

## 2021-08-04 RX ORDER — TERBUTALINE SULFATE 1 MG/ML
0.25 INJECTION, SOLUTION SUBCUTANEOUS AS NEEDED
Status: DISCONTINUED | OUTPATIENT
Start: 2021-08-04 | End: 2021-08-06 | Stop reason: HOSPADM

## 2021-08-04 RX ORDER — FAMOTIDINE 20 MG/1
20 TABLET ORAL ONCE
Status: DISCONTINUED | OUTPATIENT
Start: 2021-08-04 | End: 2021-08-04

## 2021-08-04 RX ORDER — HYDROXYZINE HYDROCHLORIDE 50 MG/ML
50 INJECTION, SOLUTION INTRAMUSCULAR EVERY 4 HOURS PRN
Status: DISCONTINUED | OUTPATIENT
Start: 2021-08-04 | End: 2021-08-09

## 2021-08-04 RX ORDER — NALBUPHINE HCL 10 MG/ML
10 AMPUL (ML) INJECTION EVERY 4 HOURS PRN
Status: DISCONTINUED | OUTPATIENT
Start: 2021-08-04 | End: 2021-08-06 | Stop reason: HOSPADM

## 2021-08-04 RX ORDER — ACETAMINOPHEN 500 MG
500 TABLET ORAL EVERY 6 HOURS PRN
Status: DISCONTINUED | OUTPATIENT
Start: 2021-08-04 | End: 2021-08-05

## 2021-08-04 RX ORDER — AMMONIA INHALANTS 0.04 G/.3ML
0.3 INHALANT RESPIRATORY (INHALATION) AS NEEDED
Status: DISCONTINUED | OUTPATIENT
Start: 2021-08-04 | End: 2021-08-06 | Stop reason: HOSPADM

## 2021-08-04 RX ORDER — METOCLOPRAMIDE HYDROCHLORIDE 5 MG/ML
10 INJECTION INTRAMUSCULAR; INTRAVENOUS ONCE
Status: DISCONTINUED | OUTPATIENT
Start: 2021-08-04 | End: 2021-08-04

## 2021-08-04 NOTE — TELEPHONE ENCOUNTER
I spoke with Dr. Sahara Bender, on call, to inform of elev bile acids, dx cholestasis. Will advise pt to come to [facility] for nst.  Stay NPO.

## 2021-08-04 NOTE — TELEPHONE ENCOUNTER
Please inform patient results from blood test shows elevated bile acids which means she has cholestasis of pregnancy, a liver problem that can increase risk for the baby. Should make an appointment today with one of our OBs to discuss.    Needs NST today a

## 2021-08-04 NOTE — PROGRESS NOTES
Pt is a 32year old female admitted to TR4/TR4-A. Patient presents with:  Non-stress Test: elevated bile acids     Pt is  37w3d intra-uterine pregnancy. History obtained, consents signed. Oriented to room, staff, and plan of care.

## 2021-08-04 NOTE — PLAN OF CARE
Problem: Patient Centered Care  Goal: Patient preferences are identified and integrated in the patient's plan of care  Description: Interventions:  - What would you like us to know as we care for you? \"it's a boy! \"  - Provide timely, complete, and accu assistance  - Assess pain using appropriate pain scale  - Administer analgesics based on type and severity of pain and evaluate response  - Implement non-pharmacological measures as appropriate and evaluate response  - Consider cultural and social influenc

## 2021-08-04 NOTE — TELEPHONE ENCOUNTER
OB History     T0    L0    SAB0  TAB0  Ectopic0  Multiple0  Live Births0   37w3d    Patient name and  verified. Patient informed of AJB note and recommendations. Patient will go to [facility] now and aware to be NPO. Soren Boswell RN notified.  Routing to providers as Monae Gold

## 2021-08-04 NOTE — H&P
[address] Patient Status:  Observation    1990 MRN [id number]   Location [address] [facility] Attending Alberto Mireles, Yoli Prado MD   [address] [employer] Day # 0 [facility].  Prerna Whyte MD , Rfl: , 8/3/2021 at Unknown time  ursodiol 300 MG Oral Cap, Take 1 capsule (300 mg total) by mouth 2 (two) times daily. , Disp: 20 capsule, Rfl: 0  Misc.  Devices (BREAST PUMP) Does not apply Misc, DOUBLE ELECTRIC BREAST PUMP EQUIVALENT TO [address] [place] PUMP IN ST newly dx with cholestasis of pregnancy by dr Joseph Rivero and sent to [facility] for delivery. Pt noted to be vtx presentation, pt counseled and pt requested to proceed with cervical ripening with cervidil followed by pitocin induction of labor.   All questions answered

## 2021-08-05 ENCOUNTER — ANESTHESIA (OUTPATIENT)
Dept: OBGYN UNIT | Facility: HOSPITAL | Age: 31
End: 2021-08-05
Payer: COMMERCIAL

## 2021-08-05 ENCOUNTER — ANESTHESIA EVENT (OUTPATIENT)
Dept: OBGYN UNIT | Facility: HOSPITAL | Age: 31
End: 2021-08-05
Payer: COMMERCIAL

## 2021-08-05 PROCEDURE — 76815 OB US LIMITED FETUS(S): CPT | Performed by: OBSTETRICS & GYNECOLOGY

## 2021-08-05 PROCEDURE — 59510 CESAREAN DELIVERY: CPT | Performed by: OBSTETRICS & GYNECOLOGY

## 2021-08-05 PROCEDURE — 59514 CESAREAN DELIVERY ONLY: CPT | Performed by: OBSTETRICS & GYNECOLOGY

## 2021-08-05 RX ORDER — PROCHLORPERAZINE EDISYLATE 5 MG/ML
5 INJECTION INTRAMUSCULAR; INTRAVENOUS ONCE AS NEEDED
Status: ACTIVE | OUTPATIENT
Start: 2021-08-05 | End: 2021-08-05

## 2021-08-05 RX ORDER — HYDROCODONE BITARTRATE AND ACETAMINOPHEN 7.5; 325 MG/1; MG/1
2 TABLET ORAL EVERY 6 HOURS PRN
Status: ACTIVE | OUTPATIENT
Start: 2021-08-05 | End: 2021-08-06

## 2021-08-05 RX ORDER — HYDROCODONE BITARTRATE AND ACETAMINOPHEN 7.5; 325 MG/1; MG/1
1 TABLET ORAL EVERY 6 HOURS PRN
Status: ACTIVE | OUTPATIENT
Start: 2021-08-05 | End: 2021-08-06

## 2021-08-05 RX ORDER — KETOROLAC TROMETHAMINE 30 MG/ML
30 INJECTION, SOLUTION INTRAMUSCULAR; INTRAVENOUS ONCE AS NEEDED
Status: CANCELLED | OUTPATIENT
Start: 2021-08-05 | End: 2021-08-05

## 2021-08-05 RX ORDER — NALBUPHINE HCL 10 MG/ML
2.5 AMPUL (ML) INJECTION EVERY 4 HOURS PRN
Status: ACTIVE | OUTPATIENT
Start: 2021-08-05 | End: 2021-08-06

## 2021-08-05 RX ORDER — TRANEXAMIC ACID 10 MG/ML
INJECTION, SOLUTION INTRAVENOUS
Status: DISCONTINUED
Start: 2021-08-05 | End: 2021-08-05 | Stop reason: WASHOUT

## 2021-08-05 RX ORDER — ONDANSETRON 2 MG/ML
4 INJECTION INTRAMUSCULAR; INTRAVENOUS ONCE AS NEEDED
Status: CANCELLED | OUTPATIENT
Start: 2021-08-05 | End: 2021-08-05

## 2021-08-05 RX ORDER — METOCLOPRAMIDE HYDROCHLORIDE 5 MG/ML
10 INJECTION INTRAMUSCULAR; INTRAVENOUS ONCE
Status: COMPLETED | OUTPATIENT
Start: 2021-08-05 | End: 2021-08-05

## 2021-08-05 RX ORDER — LIDOCAINE HYDROCHLORIDE 10 MG/ML
INJECTION, SOLUTION EPIDURAL; INFILTRATION; INTRACAUDAL; PERINEURAL AS NEEDED
Status: DISCONTINUED | OUTPATIENT
Start: 2021-08-05 | End: 2021-08-05 | Stop reason: SURG

## 2021-08-05 RX ORDER — METHYLERGONOVINE MALEATE 0.2 MG/ML
INJECTION INTRAVENOUS AS NEEDED
Status: DISCONTINUED | OUTPATIENT
Start: 2021-08-05 | End: 2021-08-05 | Stop reason: SURG

## 2021-08-05 RX ORDER — FAMOTIDINE 20 MG/1
20 TABLET ORAL ONCE
Status: COMPLETED | OUTPATIENT
Start: 2021-08-05 | End: 2021-08-05

## 2021-08-05 RX ORDER — HYDROMORPHONE HYDROCHLORIDE 1 MG/ML
0.6 INJECTION, SOLUTION INTRAMUSCULAR; INTRAVENOUS; SUBCUTANEOUS
Status: DISPENSED | OUTPATIENT
Start: 2021-08-05 | End: 2021-08-06

## 2021-08-05 RX ORDER — HALOPERIDOL 5 MG/ML
0.5 INJECTION INTRAMUSCULAR ONCE AS NEEDED
Status: ACTIVE | OUTPATIENT
Start: 2021-08-05 | End: 2021-08-05

## 2021-08-05 RX ORDER — DIPHENHYDRAMINE HCL 25 MG
25 CAPSULE ORAL EVERY 4 HOURS PRN
Status: ACTIVE | OUTPATIENT
Start: 2021-08-05 | End: 2021-08-06

## 2021-08-05 RX ORDER — FAMOTIDINE 10 MG/ML
20 INJECTION, SOLUTION INTRAVENOUS ONCE
Status: COMPLETED | OUTPATIENT
Start: 2021-08-05 | End: 2021-08-05

## 2021-08-05 RX ORDER — MORPHINE SULFATE 1 MG/ML
INJECTION, SOLUTION EPIDURAL; INTRATHECAL; INTRAVENOUS AS NEEDED
Status: DISCONTINUED | OUTPATIENT
Start: 2021-08-05 | End: 2021-08-05 | Stop reason: SURG

## 2021-08-05 RX ORDER — ACETAMINOPHEN 325 MG/1
650 TABLET ORAL EVERY 6 HOURS PRN
Status: ACTIVE | OUTPATIENT
Start: 2021-08-05 | End: 2021-08-06

## 2021-08-05 RX ORDER — CLINDAMYCIN PHOSPHATE 150 MG/ML
INJECTION, SOLUTION INTRAVENOUS AS NEEDED
Status: DISCONTINUED | OUTPATIENT
Start: 2021-08-05 | End: 2021-08-05 | Stop reason: SURG

## 2021-08-05 RX ORDER — METOCLOPRAMIDE 10 MG/1
10 TABLET ORAL ONCE
Status: COMPLETED | OUTPATIENT
Start: 2021-08-05 | End: 2021-08-05

## 2021-08-05 RX ORDER — METHYLERGONOVINE MALEATE 0.2 MG/ML
INJECTION INTRAVENOUS
Status: DISPENSED
Start: 2021-08-05 | End: 2021-08-06

## 2021-08-05 RX ORDER — CLINDAMYCIN PHOSPHATE 900 MG/50ML
900 INJECTION INTRAVENOUS ONCE
Status: DISCONTINUED | OUTPATIENT
Start: 2021-08-05 | End: 2021-08-06 | Stop reason: HOSPADM

## 2021-08-05 RX ORDER — NALOXONE HYDROCHLORIDE 0.4 MG/ML
0.08 INJECTION, SOLUTION INTRAMUSCULAR; INTRAVENOUS; SUBCUTANEOUS
Status: ACTIVE | OUTPATIENT
Start: 2021-08-05 | End: 2021-08-06

## 2021-08-05 RX ORDER — HYDROMORPHONE HYDROCHLORIDE 1 MG/ML
0.4 INJECTION, SOLUTION INTRAMUSCULAR; INTRAVENOUS; SUBCUTANEOUS
Status: ACTIVE | OUTPATIENT
Start: 2021-08-05 | End: 2021-08-06

## 2021-08-05 RX ORDER — BUPIVACAINE HYDROCHLORIDE 7.5 MG/ML
INJECTION, SOLUTION INTRASPINAL AS NEEDED
Status: DISCONTINUED | OUTPATIENT
Start: 2021-08-05 | End: 2021-08-05 | Stop reason: SURG

## 2021-08-05 RX ORDER — DIPHENHYDRAMINE HYDROCHLORIDE 50 MG/ML
25 INJECTION INTRAMUSCULAR; INTRAVENOUS ONCE AS NEEDED
Status: CANCELLED | OUTPATIENT
Start: 2021-08-05 | End: 2021-08-05

## 2021-08-05 RX ORDER — ONDANSETRON 2 MG/ML
4 INJECTION INTRAMUSCULAR; INTRAVENOUS ONCE AS NEEDED
Status: ACTIVE | OUTPATIENT
Start: 2021-08-05 | End: 2021-08-05

## 2021-08-05 RX ORDER — SODIUM CHLORIDE, SODIUM LACTATE, POTASSIUM CHLORIDE, CALCIUM CHLORIDE 600; 310; 30; 20 MG/100ML; MG/100ML; MG/100ML; MG/100ML
INJECTION, SOLUTION INTRAVENOUS CONTINUOUS
Status: CANCELLED | OUTPATIENT
Start: 2021-08-05

## 2021-08-05 RX ORDER — CARBOPROST TROMETHAMINE 250 UG/ML
INJECTION, SOLUTION INTRAMUSCULAR
Status: DISCONTINUED
Start: 2021-08-05 | End: 2021-08-05 | Stop reason: WASHOUT

## 2021-08-05 RX ORDER — ACETAMINOPHEN 500 MG
1000 TABLET ORAL EVERY 6 HOURS PRN
Status: DISCONTINUED | OUTPATIENT
Start: 2021-08-05 | End: 2021-08-06 | Stop reason: HOSPADM

## 2021-08-05 RX ORDER — MISOPROSTOL 200 UG/1
TABLET ORAL
Status: DISPENSED
Start: 2021-08-05 | End: 2021-08-06

## 2021-08-05 RX ORDER — NALBUPHINE HCL 10 MG/ML
2.5 AMPUL (ML) INJECTION
Status: DISCONTINUED | OUTPATIENT
Start: 2021-08-05 | End: 2021-08-09

## 2021-08-05 RX ORDER — DIPHENHYDRAMINE HYDROCHLORIDE 50 MG/ML
12.5 INJECTION INTRAMUSCULAR; INTRAVENOUS EVERY 4 HOURS PRN
Status: ACTIVE | OUTPATIENT
Start: 2021-08-05 | End: 2021-08-06

## 2021-08-05 RX ADMIN — SODIUM CHLORIDE, SODIUM LACTATE, POTASSIUM CHLORIDE, CALCIUM CHLORIDE: 600; 310; 30; 20 INJECTION, SOLUTION INTRAVENOUS at 22:28:00

## 2021-08-05 RX ADMIN — LIDOCAINE HYDROCHLORIDE 5 ML: 10 INJECTION, SOLUTION EPIDURAL; INFILTRATION; INTRACAUDAL; PERINEURAL at 21:26:00

## 2021-08-05 RX ADMIN — CLINDAMYCIN PHOSPHATE 900 MG: 150 INJECTION, SOLUTION INTRAVENOUS at 21:36:00

## 2021-08-05 RX ADMIN — BUPIVACAINE HYDROCHLORIDE 1.5 ML: 7.5 INJECTION, SOLUTION INTRASPINAL at 21:26:00

## 2021-08-05 RX ADMIN — MORPHINE SULFATE 0.2 MG: 1 INJECTION, SOLUTION EPIDURAL; INTRATHECAL; INTRAVENOUS at 21:26:00

## 2021-08-05 RX ADMIN — SODIUM CHLORIDE, SODIUM LACTATE, POTASSIUM CHLORIDE, CALCIUM CHLORIDE: 600; 310; 30; 20 INJECTION, SOLUTION INTRAVENOUS at 21:22:00

## 2021-08-05 RX ADMIN — METHYLERGONOVINE MALEATE 0.2 MG: 0.2 INJECTION INTRAVENOUS at 21:57:00

## 2021-08-05 NOTE — PROGRESS NOTES
Progress note  Pt feeling some strong contractions with gain pain control with pain meds. Pt requested recheck presentation,  Declined sve at this time    Limited bedside ultrasound   Vertex presentation.      nst   Reactive, cat 1, moderate variability

## 2021-08-05 NOTE — PROGRESS NOTES
Patient checked and no presenting part palpable. BSUS confirms that fetus is Transverse lie to maternal right with back up. Discussed proceeding with Primary  as originally planned due to unstable lie.   Risks of surgery reviewed with patient and

## 2021-08-06 ENCOUNTER — TELEPHONE (OUTPATIENT)
Dept: OBGYN CLINIC | Facility: CLINIC | Age: 31
End: 2021-08-06

## 2021-08-06 LAB
BASOPHILS # BLD AUTO: 0.04 X10(3) UL (ref 0–0.2)
BASOPHILS NFR BLD AUTO: 0.4 %
DEPRECATED RDW RBC AUTO: 49.9 FL (ref 35.1–46.3)
EOSINOPHIL # BLD AUTO: 0.02 X10(3) UL (ref 0–0.7)
EOSINOPHIL NFR BLD AUTO: 0.2 %
ERYTHROCYTE [DISTWIDTH] IN BLOOD BY AUTOMATED COUNT: 15.5 % (ref 11–15)
FETAL SCREEN RESULT: NEGATIVE
HCT VFR BLD AUTO: 28.9 %
HGB BLD-MCNC: 9.1 G/DL
IMM GRANULOCYTES # BLD AUTO: 0.08 X10(3) UL (ref 0–1)
IMM GRANULOCYTES NFR BLD: 0.8 %
LYMPHOCYTES # BLD AUTO: 0.9 X10(3) UL (ref 1–4)
LYMPHOCYTES NFR BLD AUTO: 9.2 %
MCH RBC QN AUTO: 27.7 PG (ref 26–34)
MCHC RBC AUTO-ENTMCNC: 31.5 G/DL (ref 31–37)
MCV RBC AUTO: 87.8 FL
MONOCYTES # BLD AUTO: 0.76 X10(3) UL (ref 0.1–1)
MONOCYTES NFR BLD AUTO: 7.8 %
NEUTROPHILS # BLD AUTO: 7.97 X10 (3) UL (ref 1.5–7.7)
NEUTROPHILS # BLD AUTO: 7.97 X10(3) UL (ref 1.5–7.7)
NEUTROPHILS NFR BLD AUTO: 81.6 %
PLATELET # BLD AUTO: 136 10(3)UL (ref 150–450)
RBC # BLD AUTO: 3.29 X10(6)UL
WBC # BLD AUTO: 9.8 X10(3) UL (ref 4–11)

## 2021-08-06 RX ORDER — SODIUM PHOSPHATE, DIBASIC AND SODIUM PHOSPHATE, MONOBASIC 7; 19 G/133ML; G/133ML
1 ENEMA RECTAL ONCE AS NEEDED
Status: DISCONTINUED | OUTPATIENT
Start: 2021-08-06 | End: 2021-08-09

## 2021-08-06 RX ORDER — AMMONIA INHALANTS 0.04 G/.3ML
0.3 INHALANT RESPIRATORY (INHALATION) AS NEEDED
Status: DISCONTINUED | OUTPATIENT
Start: 2021-08-06 | End: 2021-08-09

## 2021-08-06 RX ORDER — ACETAMINOPHEN 500 MG
1000 TABLET ORAL EVERY 6 HOURS
Status: DISCONTINUED | OUTPATIENT
Start: 2021-08-06 | End: 2021-08-09

## 2021-08-06 RX ORDER — BISACODYL 10 MG
10 SUPPOSITORY, RECTAL RECTAL
Status: DISCONTINUED | OUTPATIENT
Start: 2021-08-06 | End: 2021-08-09

## 2021-08-06 RX ORDER — POLYETHYLENE GLYCOL 3350 17 G/17G
17 POWDER, FOR SOLUTION ORAL DAILY PRN
Status: DISCONTINUED | OUTPATIENT
Start: 2021-08-06 | End: 2021-08-09

## 2021-08-06 RX ORDER — DEXTROSE, SODIUM CHLORIDE, SODIUM LACTATE, POTASSIUM CHLORIDE, AND CALCIUM CHLORIDE 5; .6; .31; .03; .02 G/100ML; G/100ML; G/100ML; G/100ML; G/100ML
INJECTION, SOLUTION INTRAVENOUS CONTINUOUS
Status: DISCONTINUED | OUTPATIENT
Start: 2021-08-06 | End: 2021-08-09

## 2021-08-06 RX ORDER — SIMETHICONE 80 MG
80 TABLET,CHEWABLE ORAL 3 TIMES DAILY PRN
Status: DISCONTINUED | OUTPATIENT
Start: 2021-08-06 | End: 2021-08-09

## 2021-08-06 RX ORDER — KETOROLAC TROMETHAMINE 30 MG/ML
30 INJECTION, SOLUTION INTRAMUSCULAR; INTRAVENOUS EVERY 6 HOURS
Status: COMPLETED | OUTPATIENT
Start: 2021-08-06 | End: 2021-08-06

## 2021-08-06 RX ORDER — DOCUSATE SODIUM 100 MG/1
100 CAPSULE, LIQUID FILLED ORAL
Status: DISCONTINUED | OUTPATIENT
Start: 2021-08-06 | End: 2021-08-09

## 2021-08-06 RX ORDER — IBUPROFEN 600 MG/1
600 TABLET ORAL EVERY 6 HOURS
Status: DISCONTINUED | OUTPATIENT
Start: 2021-08-06 | End: 2021-08-09

## 2021-08-06 RX ORDER — GABAPENTIN 300 MG/1
300 CAPSULE ORAL EVERY 8 HOURS PRN
Status: DISCONTINUED | OUTPATIENT
Start: 2021-08-06 | End: 2021-08-09

## 2021-08-06 RX ORDER — ONDANSETRON 2 MG/ML
4 INJECTION INTRAMUSCULAR; INTRAVENOUS EVERY 6 HOURS PRN
Status: DISCONTINUED | OUTPATIENT
Start: 2021-08-06 | End: 2021-08-09

## 2021-08-06 NOTE — ANESTHESIA PROCEDURE NOTES
Spinal Block  Performed by: Essie Rico MD  Authorized by: Essie Rico MD       General Information and Staff    Start Time:  8/5/2021 9:26 PM  End Time:  8/5/2021 9:32 PM  Anesthesiologist:  Essie Rico MD  Performed by:   Anesthesi

## 2021-08-06 NOTE — ANESTHESIA PREPROCEDURE EVALUATION
Anesthesia PreOp Note    HPI:     Lois Orlando is a 32year old female who presents for preoperative consultation requested by: Nic Gonzalez MD    Date of Surgery: 2021    Procedure(s):   SECTION  Indication: Breech    Relevant Prob ELECTRIC BREAST PUMP EQUIVALENT TO MEDELA PUMP IN STYLE (Patient not taking: Reported on 6/29/2021 ), Disp: 1 each, Rfl: 0      oxyTOCIN (PITOCIN) 30 units/ 500 ml 0.9% NS premix infusion, 0.5-20 say-units/min, Intravenous, Continuous, Judith Stacy 30 mL, Oral, PRN, Greg Bai MD  ondansetron [facility]) injection 4 mg, 4 mg, Intravenous, Q6H PRN, Greg Bai MD  Ammonia Aromatic (ammonia) nasal solution 0.3 mL, 0.3 mL, Nasal, PRN, Santino Stacy MD  lidocaine PF (XYLOCAINE) 1% Attends [employer] Services:       Active Member of Clubs or Organizations:       Attends Club or Organization Meetings:       Marital Status:   Intimate Partner Violence:       Fear of Current or Ex-Partner:       Emotionally Abused:       Physically Abuse

## 2021-08-06 NOTE — ANESTHESIA POSTPROCEDURE EVALUATION
Patient: Crystal Borjas    Procedure Summary     Date: 21 Room / Location: [address] [place] [address] L+D OR  [facility] L+D OR    Anesthesia Start:  Anesthesia Stop:     Procedure:  SECTION (N/A ) Diagnosis: (Breech)    Surgeons: MD Andres Ochoa

## 2021-08-06 NOTE — ANESTHESIA POST-OP FOLLOW-UP NOTE
Ms. Casandra Koch is POD#1 after her  performed under spinal anesthesia. Denies headache, back pain, or residual LE weakness/numbness. No further anesthesia management needed at this time. Doing well on oral pain meds.      All anesthetic que

## 2021-08-06 NOTE — OPERATIVE REPORT
[facility]    PATIENT'S NAME: Negra Living   ATTENDING PHYSICIAN: Greg Raygoza MD   OPERATING PHYSICIAN: Papito Garcia.  Obed Flynn MD   PATIENT ACCOUNT#:   [id number]    LOCATION:  [address] [place] [address] #:   [id number]       DATE OF incised and carried laterally with Mendes scissors. The fascia was bluntly and sharply dissected off the rectus muscles. The rectus muscles were bluntly and sharply .   The peritoneum was elevated and entered with Metzenbaum scissors and carried up and a tight pressure dressing was applied. The uterus was expressed. A small amount of bleeding was expressed through the vagina. The patient was brought into the recovery room in stable postoperative condition. Dictated By Zander Cabello MD  d:

## 2021-08-06 NOTE — PROGRESS NOTES
[facility] - [facility]    OB/GYNE Progress Note      Lois Orlando Patient Status:  Inpatient    1990 MRN [id number]   Location [facility] 3SE Attending Hoang Pastor MD   [facility] Day # 2 PCP Caroline Gregory.  Gila Cabello MD     Assessment 04/17/2021    BILUR Negative 04/17/2021    BLOODURINE Small (A) 04/17/2021    NITRITE Negative 07/30/2021    UROBILINOGEN <2.0 04/17/2021    LEUUR Large (A) 04/17/2021       No results found. Benny Lee MD  8/6/2021  7:17 AM

## 2021-08-06 NOTE — PROGRESS NOTES
Patient transferred to mother/baby room 352 per cart in stable condition with baby and personal belongings. Accompanied by  and staff. Report given to Avery mother/baby RN.

## 2021-08-06 NOTE — BRIEF OP NOTE
Pre-Operative Diagnosis: Transverse Lie. Cholestasis     Post-Operative Diagnosis: Same     Procedure Performed: Primary Low Transverse  through Pfannenstiel skin incision.       Surgeon(s) and Role:     * Patrick Mathew MD - Primary     * Hakeem

## 2021-08-07 RX ORDER — MELATONIN
325
Status: DISCONTINUED | OUTPATIENT
Start: 2021-08-07 | End: 2021-08-09

## 2021-08-07 RX ORDER — HYDROMORPHONE HYDROCHLORIDE 2 MG/1
1 TABLET ORAL EVERY 4 HOURS PRN
Status: DISCONTINUED | OUTPATIENT
Start: 2021-08-07 | End: 2021-08-09

## 2021-08-07 NOTE — PLAN OF CARE
Tylenol and toradol for pain management. Independent in toileting. Abdominal binder bedside. Encouraged increasing intake of liquids. Will continue plan of care.

## 2021-08-07 NOTE — PLAN OF CARE
Problem: Patient Centered Care  Goal: Patient preferences are identified and integrated in the patient's plan of care  Description: Interventions:  - What would you like us to know as we care for you? \"it's a boy! \"  - Provide timely, complete, and accu side effects  - Notify MD/LIP if interventions unsuccessful or patient reports new pain  - Anticipate increased pain with activity and pre-medicate as appropriate  Outcome: Progressing     Problem: ANXIETY  Goal: Will report anxiety at manageable levels  D crying.  - Discuss/demonstrate breast feeding aids (e.g., infant sling, nursing footstool/pillows, and breast pumps). - Encourage mother/other family members to express feelings/concerns, and actively listen.   - Educate father/SO about benefits of breast

## 2021-08-07 NOTE — PROGRESS NOTES
POD 2    Pt feels well. Ambulating and eating well. Alert and oriented, nad  abd soft nontender, incision c/d/i  Ext nt    Hb 9    A/ P POD 2   Pt doing well. Fe for mild anemia. Pt to ambulate. All questions answered.

## 2021-08-08 RX ORDER — HYDROCODONE BITARTRATE AND ACETAMINOPHEN 5; 325 MG/1; MG/1
1 TABLET ORAL EVERY 6 HOURS PRN
Status: DISCONTINUED | OUTPATIENT
Start: 2021-08-08 | End: 2021-08-09

## 2021-08-08 NOTE — PROGRESS NOTES
[facility]    OB/GYNE Progress Note      Arnie Dubon Patient Status:  Inpatient    1990 MRN [id number]   Location [facility] 3SE Attending Mamta Peng MD   [address] Day [address] 4 PCP Tate Kenny MD     Assessment KETUR Negative 04/17/2021    BILUR Negative 04/17/2021    BLOODURINE Small (A) 04/17/2021    NITRITE Negative 07/30/2021    UROBILINOGEN <2.0 04/17/2021    LEUUR Large (A) 04/17/2021       No results found. Benny Lacy MD  8/8/2021  5:16

## 2021-08-09 VITALS
DIASTOLIC BLOOD PRESSURE: 63 MMHG | HEART RATE: 78 BPM | WEIGHT: 137.63 LBS | BODY MASS INDEX: 24 KG/M2 | TEMPERATURE: 98 F | SYSTOLIC BLOOD PRESSURE: 107 MMHG | OXYGEN SATURATION: 100 % | RESPIRATION RATE: 18 BRPM

## 2021-08-09 RX ORDER — IBUPROFEN 600 MG/1
600 TABLET ORAL EVERY 6 HOURS PRN
Qty: 30 TABLET | Refills: 0 | Status: SHIPPED | OUTPATIENT
Start: 2021-08-09

## 2021-08-09 RX ORDER — HYDROCODONE BITARTRATE AND ACETAMINOPHEN 5; 325 MG/1; MG/1
1 TABLET ORAL EVERY 6 HOURS PRN
Qty: 15 TABLET | Refills: 0 | Status: SHIPPED | OUTPATIENT
Start: 2021-08-09 | End: 2021-08-19

## 2021-08-09 NOTE — PLAN OF CARE
Problem: Patient Centered Care  Goal: Patient preferences are identified and integrated in the patient's plan of care  Description: Interventions:  - What would you like us to know as we care for you? \"it's a boy! \"  - Provide timely, complete, and accu side effects  - Notify MD/LIP if interventions unsuccessful or patient reports new pain  - Anticipate increased pain with activity and pre-medicate as appropriate  Outcome: Completed     Problem: ANXIETY  Goal: Will report anxiety at manageable levels  Logan crying.  - Discuss/demonstrate breast feeding aids (e.g., infant sling, nursing footstool/pillows, and breast pumps). - Encourage mother/other family members to express feelings/concerns, and actively listen.   - Educate father/SO about benefits of breast Voiding freely. Per pt passing gas. Incision with staples dry and intact. Fundus is firm, U/2, bleeding is scant. Plan of care reviewed with pt. Paperwork done. Questions and concerns answered. Anticipating discharge. Will continue with plan of care.

## 2021-08-09 NOTE — TELEPHONE ENCOUNTER
Partner answered phone call and not listed in Dorinda Sensor. This RN to call patient back to help schedule appt.

## 2021-08-09 NOTE — DISCHARGE SUMMARY
[facility] - [facility]    Discharge Summary    Sydnie Pollack Patient Status:  Inpatient    1990 MRN [id number]   Location [facility] 3SE Attending Christy Rowe MD   [address] Day [address] 5 PCP Soni Quigley.  Ayse Lee MD     Date of Admissio ELECTRIC BREAST PUMP EQUIVALENT TO [address] INFRARED IMAGING SYSTEMS PUMP IN STYLE              Discharge Diet: General diet    Discharge Activity: As tolerated    Follow up:      Follow-up Information     Schedule an appointment as soon as possible for a visit in 2 days to follow up directed.      Wound Care  The following instructions will promote proper healing and help to prevent infection  · Vaginal/perineum Care: [address] for 15mins, 2-3 times a day,     Bathing/Showers  · You may resume showers  · No baths, swimming, hot tubs un

## 2021-08-11 ENCOUNTER — NURSE ONLY (OUTPATIENT)
Dept: OBGYN CLINIC | Facility: CLINIC | Age: 31
End: 2021-08-11
Payer: COMMERCIAL

## 2021-08-17 ENCOUNTER — TELEPHONE (OUTPATIENT)
Dept: OBGYN CLINIC | Facility: CLINIC | Age: 31
End: 2021-08-17

## 2021-08-17 NOTE — TELEPHONE ENCOUNTER
Patient had to cancel appointment for two week post partum due to her new born having to go to the hospital at last minute for blood work. Nothing available, please advise what we can do. Possible video?

## 2021-08-19 ENCOUNTER — OFFICE VISIT (OUTPATIENT)
Dept: OBGYN CLINIC | Facility: CLINIC | Age: 31
End: 2021-08-19
Payer: COMMERCIAL

## 2021-08-19 VITALS
SYSTOLIC BLOOD PRESSURE: 92 MMHG | HEART RATE: 81 BPM | WEIGHT: 108.25 LBS | BODY MASS INDEX: 19 KG/M2 | DIASTOLIC BLOOD PRESSURE: 61 MMHG

## 2021-08-19 DIAGNOSIS — Z48.89 ENCOUNTER FOR POST SURGICAL WOUND CHECK: Primary | ICD-10-CM

## 2021-08-19 PROCEDURE — 3074F SYST BP LT 130 MM HG: CPT | Performed by: NURSE PRACTITIONER

## 2021-08-19 PROCEDURE — 99024 POSTOP FOLLOW-UP VISIT: CPT | Performed by: NURSE PRACTITIONER

## 2021-08-19 PROCEDURE — 3078F DIAST BP <80 MM HG: CPT | Performed by: NURSE PRACTITIONER

## 2021-08-19 NOTE — PROGRESS NOTES
[facility]  Obstetrics and Gynecology  2 Week Post Operative Wound Check  Jane Gay, MSN, APRN, FNP-BC    HPI   Patient presents to the clinic for a 2 week post operative wound check. She had a primary C/S on 8/5.   Doing well - pain being managed Mental Status: She is alert and oriented to person, place, and time. Psychiatric:         Mood and Affect: Mood normal.         Behavior: Behavior normal.         Thought Content:  Thought content normal.          ASSESSMENT/PLAN   - Continue Ibuprofen AL

## 2021-08-21 ENCOUNTER — TELEPHONE (OUTPATIENT)
Dept: OBGYN UNIT | Facility: HOSPITAL | Age: 31
End: 2021-08-21

## 2021-08-27 ENCOUNTER — TELEPHONE (OUTPATIENT)
Dept: OBGYN UNIT | Facility: HOSPITAL | Age: 31
End: 2021-08-27

## 2021-09-03 ENCOUNTER — TELEPHONE (OUTPATIENT)
Dept: OBGYN CLINIC | Facility: CLINIC | Age: 31
End: 2021-09-03

## 2021-09-03 NOTE — TELEPHONE ENCOUNTER
Patient name and  verified. Patient states today she noticed a \"pinch\" of redness to the middle of her incision. Per patient feels incision does look \"clean and clear. \" Denies redness in other areas of incision, fever, body aches, odor from inci

## 2021-09-04 ENCOUNTER — NURSE ONLY (OUTPATIENT)
Dept: OBGYN CLINIC | Facility: CLINIC | Age: 31
End: 2021-09-04
Payer: COMMERCIAL

## 2021-09-04 NOTE — PROGRESS NOTES
Incision clean, no redness, discharge, and completely healed. Tiny brown scab noted midline above the incision.

## 2021-09-16 ENCOUNTER — POSTPARTUM (OUTPATIENT)
Dept: OBGYN CLINIC | Facility: CLINIC | Age: 31
End: 2021-09-16
Payer: COMMERCIAL

## 2021-09-16 VITALS — DIASTOLIC BLOOD PRESSURE: 63 MMHG | SYSTOLIC BLOOD PRESSURE: 92 MMHG | BODY MASS INDEX: 18 KG/M2 | WEIGHT: 104 LBS

## 2021-09-16 PROCEDURE — 3074F SYST BP LT 130 MM HG: CPT | Performed by: OBSTETRICS & GYNECOLOGY

## 2021-09-16 PROCEDURE — 3078F DIAST BP <80 MM HG: CPT | Performed by: OBSTETRICS & GYNECOLOGY

## 2021-09-16 NOTE — PROGRESS NOTES
Subjective:   Patient ID: Mark Zabala is a 32year old female. Patient here for postpartum exam.  S/P Primary  for transverse lie and Cholestasis. Doing well. No C/Os. Menses started today and is heavy.   Patient reassured that first m and examination of lactating mother  (primary encounter diagnosis)  All questions answered. Encouraged condoms. No orders of the defined types were placed in this encounter.       Meds This Visit:  Requested Prescriptions      No prescriptions requested

## 2022-08-15 ENCOUNTER — HOSPITAL ENCOUNTER (OUTPATIENT)
Dept: ULTRASOUND IMAGING | Facility: HOSPITAL | Age: 32
Discharge: HOME | End: 2022-08-15
Attending: OBSTETRICS & GYNECOLOGY
Payer: COMMERCIAL

## 2022-08-15 ENCOUNTER — OFFICE VISIT (OUTPATIENT)
Dept: OBGYN CLINIC | Facility: CLINIC | Age: 32
End: 2022-08-15
Payer: COMMERCIAL

## 2022-08-15 VITALS — WEIGHT: 105 LBS | DIASTOLIC BLOOD PRESSURE: 62 MMHG | BODY MASS INDEX: 19 KG/M2 | SYSTOLIC BLOOD PRESSURE: 98 MMHG

## 2022-08-15 DIAGNOSIS — O20.0 THREATENED ABORTION, ANTEPARTUM: ICD-10-CM

## 2022-08-15 DIAGNOSIS — N92.6 MISSED MENSES: Primary | ICD-10-CM

## 2022-08-15 DIAGNOSIS — N92.6 MISSED MENSES: ICD-10-CM

## 2022-08-15 LAB
CONTROL LINE PRESENT WITH A CLEAR BACKGROUND (YES/NO): YES YES/NO
PREGNANCY TEST, URINE: POSITIVE

## 2022-08-15 PROCEDURE — 3078F DIAST BP <80 MM HG: CPT | Performed by: OBSTETRICS & GYNECOLOGY

## 2022-08-15 PROCEDURE — 76801 OB US < 14 WKS SINGLE FETUS: CPT | Performed by: OBSTETRICS & GYNECOLOGY

## 2022-08-15 PROCEDURE — 81025 URINE PREGNANCY TEST: CPT | Performed by: OBSTETRICS & GYNECOLOGY

## 2022-08-15 PROCEDURE — 3074F SYST BP LT 130 MM HG: CPT | Performed by: OBSTETRICS & GYNECOLOGY

## 2022-08-15 PROCEDURE — 99213 OFFICE O/P EST LOW 20 MIN: CPT | Performed by: OBSTETRICS & GYNECOLOGY

## 2022-08-16 ENCOUNTER — TELEPHONE (OUTPATIENT)
Dept: OBGYN CLINIC | Facility: CLINIC | Age: 32
End: 2022-08-16

## 2022-08-16 NOTE — TELEPHONE ENCOUNTER
Patient name and  verified. Patient asking for ultrasound results per patient was not contacted by provider. Ultrasound results reviewed with patient. Advised patient rest, avoid strenuous activity, heavy lifting, or straining. Patient also encouraged to complete labs ASAP. States ultrasound took 2 hours yesterday to complete and lab was closed once she was done. Routing message to [facility] as FYI.

## 2022-08-16 NOTE — TELEPHONE ENCOUNTER
SALVATORE/Porfirio message sent to pt.        ----- Message from Lili Gomez MD sent at 8/16/2022  3:08 AM CDT -----  I sent this patient for a Hold and Call ultrasound yesterday due to her vaginal bleeding. Patient was informed of the results by the On-Call doctor. Patient is O Negative blood type and she did not go to the Lab for her Antibody screen as I instructed. Call patient and encourage her to go to the Lab.   If her Antibody screen is Negative then she will need Rho Barry.

## 2022-08-16 NOTE — TELEPHONE ENCOUNTER
Pt is calling to let office to say by the time she was done with ultrasound the lab was closed ,    Pt is also looking  For ultrasound results

## 2022-08-17 ENCOUNTER — TELEPHONE (OUTPATIENT)
Dept: OBGYN CLINIC | Facility: CLINIC | Age: 32
End: 2022-08-17

## 2022-08-17 NOTE — TELEPHONE ENCOUNTER
----- Message from Brenden Bob MD sent at 8/17/2022 11:34 AM CDT -----  I still do not see that this patient has gone to the Lab for her Antibody screen. She is O Negative with bleeding in the First Trimester. Please contact her and encourage her to go to the Lab. I am concerned that this bleeding may Isoimmunize her and that has the potential of causing problems with future pregnancies.

## 2022-08-18 NOTE — TELEPHONE ENCOUNTER
Spoke with patient's emergency contact Ludivina Gao that office has attempted to contact patient multiple times and have not heard back. Informed Gregorio patient needs to complete pending labs ASAP. [facility] contact information given to him. IF patient calls back please connect to nurses.

## 2022-08-19 ENCOUNTER — LAB ENCOUNTER (OUTPATIENT)
Dept: LAB | Facility: HOSPITAL | Age: 32
End: 2022-08-19
Attending: OBSTETRICS & GYNECOLOGY
Payer: COMMERCIAL

## 2022-08-19 ENCOUNTER — TELEPHONE (OUTPATIENT)
Dept: OBGYN CLINIC | Facility: CLINIC | Age: 32
End: 2022-08-19

## 2022-08-19 DIAGNOSIS — N92.6 MISSED MENSES: ICD-10-CM

## 2022-08-19 DIAGNOSIS — O20.0 THREATENED ABORTION, ANTEPARTUM: ICD-10-CM

## 2022-08-19 LAB
ANTIBODY SCREEN: NEGATIVE
RH BLOOD TYPE: NEGATIVE

## 2022-08-19 PROCEDURE — 86901 BLOOD TYPING SEROLOGIC RH(D): CPT

## 2022-08-19 PROCEDURE — 86900 BLOOD TYPING SEROLOGIC ABO: CPT

## 2022-08-19 PROCEDURE — 86850 RBC ANTIBODY SCREEN: CPT

## 2022-08-19 PROCEDURE — 36415 COLL VENOUS BLD VENIPUNCTURE: CPT

## 2022-08-19 NOTE — TELEPHONE ENCOUNTER
VM not set up unable to leave a message. Placed call to Rishidayana Mary whom is on pt's verlbal release and was able to get in touch with pt. Pt Name and  verified. This RN  informed that she needs to complete her Rhogam injection within 72 hours ASAP. Advised that she should come in today and complete. Pt states that she will call back so she can see if she is able to complete it. Advised that if past the 72 hrs she will need to complete blood work once again. Will place a call before 1:30 if pt has not returned call at that time.

## 2022-08-19 NOTE — TELEPHONE ENCOUNTER
----- Message from Jairo Camargo MD sent at 8/19/2022 12:40 PM CDT -----  Patient has finally gone to the Lab and her Antibody screen is Negative. Patient must come into the office for her Rhogam injection ASAP. We can only do it within 72 hours. Call patient.

## 2022-08-19 NOTE — TELEPHONE ENCOUNTER
This RN called pt again and was unable to get a hold of the patient. No vm is set up for pt's number and emergency contact did not answer but vm was left.

## 2022-08-19 NOTE — TELEPHONE ENCOUNTER
Letter generated and given to  staff to be delivered certified to pt.       ----- Message from Jairo Camargo MD sent at 8/19/2022  7:29 AM CDT -----  Patient still has not gone to Lab for her Antibody screen. Please send a Certified letter.

## 2022-08-22 NOTE — TELEPHONE ENCOUNTER
Attempted to contact patient and no VM set up. Detailed VM left on emergency contact phone. IF patient does not receive rhogam injection by 11am today patient will need to repeat labs. If patient calls back please connect to nurses.

## 2022-09-26 ENCOUNTER — TELEPHONE (OUTPATIENT)
Dept: OBGYN CLINIC | Facility: CLINIC | Age: 32
End: 2022-09-26

## 2022-09-26 NOTE — TELEPHONE ENCOUNTER
Pt started bleeding today, but because [place] doctors won't see her without the ultrasound. Please advise if how to get the actual ultrasound. She has left message with records 1-1/2 week ago and today and no one has gotten back to her. The doctor email [email or web address]

## 2022-09-26 NOTE — TELEPHONE ENCOUNTER
Patient name and  verified. Patient requesting records to be sent to new ob office. Results printed and emailed to [email or web address]. advised patient if she is still experiencing bleeding she needs to receive her rhogam injection. This RN stressed importance of receiving rhogam in pregnancy. Verbalized understanding.

## (undated) DEVICE — ARISTA AH ABSORBABLE HEMOSTATIC PARTICLES: Brand: ARISTA™ AH

## (undated) DEVICE — PROXIMATE SKIN STAPLERS (35 WIDE) CONTAINS 35 STAINLESS STEEL STAPLES (FIXED HEAD): Brand: PROXIMATE

## (undated) NOTE — LETTER
[facility]  Administration of Anesthesia  1. Mendes Otho, or _________________________________ acting on her behalf, (Patient) (Dependent/Representative) request to receive anesthesia for my pending procedure/operation/treatment. bleeding, seizure, cardiac arrest and death. 7. AWARENESS: I understand that it is possible (but unlikely) to have explicit memory of events from the operating room while under general anesthesia.   8. ELECTROCONVULSIVE THERAPY PATIENTS: This consent serve below affirms that prior to the time of the procedure, I have explained to the patient and/or his/her guardian, the risks and benefits of undergoing anesthesia, as well as any reasonable alternatives.     ___________________________________________________

## (undated) NOTE — LETTER
8/22/2022              Jose Scott        [address]        [place] [address]         To Whom It May Concern,    This letter is to inform you that our office has made several attempts to reach you by phone to schedule an appointment in office for your Rhogam injection. Please contact our office at the number listed below as soon as you receive this letter to discuss scheduling a follow up visit in office as soon as possible. Sincerely,    Rossana Canales.  Alexy Cabrera, [address] [facility] [address]  [address] [place] [address]  [phone]

## (undated) NOTE — Clinical Note
IUP at 34w4d  Large for gestational age fetus; EFW is at the 95%; 3442 g (7 lbs 9 oz)  Polyhydramnios     RECOMMENDATIONS:  Continue care with Dr. Rome Blum  Weekly NST & NANCY at 36 weeks  Repeat growth ultrasound in 3 to 4 weeks  I recommend delivery at 39 wee